# Patient Record
Sex: FEMALE | Race: WHITE | Employment: FULL TIME | URBAN - METROPOLITAN AREA
[De-identification: names, ages, dates, MRNs, and addresses within clinical notes are randomized per-mention and may not be internally consistent; named-entity substitution may affect disease eponyms.]

---

## 2017-08-21 ENCOUNTER — OFFICE VISIT (OUTPATIENT)
Dept: NEUROLOGY | Age: 62
End: 2017-08-21

## 2017-08-21 ENCOUNTER — HOSPITAL ENCOUNTER (OUTPATIENT)
Dept: GENERAL RADIOLOGY | Age: 62
Discharge: HOME | End: 2017-08-21
Payer: COMMERCIAL

## 2017-08-21 VITALS
DIASTOLIC BLOOD PRESSURE: 66 MMHG | RESPIRATION RATE: 16 BRPM | HEART RATE: 65 BPM | OXYGEN SATURATION: 98 % | HEIGHT: 66 IN | WEIGHT: 138 LBS | BODY MASS INDEX: 22.18 KG/M2 | SYSTOLIC BLOOD PRESSURE: 124 MMHG

## 2017-08-21 DIAGNOSIS — E11.42 DIABETIC PERIPHERAL NEUROPATHY ASSOCIATED WITH TYPE 2 DIABETES MELLITUS (HCC): ICD-10-CM

## 2017-08-21 DIAGNOSIS — G60.8 IDIOPATHIC SMALL AND LARGE FIBER SENSORY NEUROPATHY: Primary | ICD-10-CM

## 2017-08-21 DIAGNOSIS — M54.16 LUMBAR BACK PAIN WITH RADICULOPATHY AFFECTING RIGHT LOWER EXTREMITY: ICD-10-CM

## 2017-08-21 DIAGNOSIS — G60.8 IDIOPATHIC SMALL AND LARGE FIBER SENSORY NEUROPATHY: ICD-10-CM

## 2017-08-21 DIAGNOSIS — G44.209 TENSION VASCULAR HEADACHE: ICD-10-CM

## 2017-08-21 DIAGNOSIS — M47.22 CERVICAL RADICULOPATHY DUE TO DEGENERATIVE JOINT DISEASE OF SPINE: ICD-10-CM

## 2017-08-21 DIAGNOSIS — E53.8 B12 DEFICIENCY: ICD-10-CM

## 2017-08-21 DIAGNOSIS — E55.9 VITAMIN D DEFICIENCY: ICD-10-CM

## 2017-08-21 DIAGNOSIS — F51.04 CHRONIC INSOMNIA: ICD-10-CM

## 2017-08-21 DIAGNOSIS — I67.89 CEREBRAL MICROVASCULAR DISEASE: ICD-10-CM

## 2017-08-21 DIAGNOSIS — M54.16 LUMBAR BACK PAIN WITH RADICULOPATHY AFFECTING LEFT LOWER EXTREMITY: ICD-10-CM

## 2017-08-21 DIAGNOSIS — I65.23 STENOSIS OF BOTH INTERNAL CAROTID ARTERIES: ICD-10-CM

## 2017-08-21 PROCEDURE — 72052 X-RAY EXAM NECK SPINE 6/>VWS: CPT

## 2017-08-21 RX ORDER — AMITRIPTYLINE HYDROCHLORIDE 10 MG/1
10 TABLET, FILM COATED ORAL
Qty: 100 TAB | Refills: 11 | Status: SHIPPED | OUTPATIENT
Start: 2017-08-21 | End: 2018-03-07 | Stop reason: SDUPTHER

## 2017-08-21 NOTE — PROGRESS NOTES
Consult  REFERRED BY:  Bhupendra Quinn MD    CHIEF COMPLAINT: Headaches and migratory paresthesias and neck pain and insomnia and back pain      Subjective:     Rebel Easley is a 65 y.o. right-handed  female seen today as a new patient to me for evaluation of a new problem of progressive worsening headaches that come and go almost daily, at the request of Dr. Alecia Pond. Teeth have been getting worse over the last year, and no certain activity, time a day seems to bring him on, they can occur in any part of her head, sometimes generalized sometimes more on the right side, and are not associated with nausea vomiting. She does not have any new weakness or sensory loss with the headaches, but is convinced that something wrong. She has had no recent fever, trauma, infection or meningismus or other precipitating causes for the headaches. She does look a little depressed and admits to chronic insomnia, but says she has no worries. She had a previous EMG study that suggested some minor abnormalities as far as her ulnar nerves and peroneal nerves, but no clear diagnosis of neuropathy was made 3 years ago. The numbness and tingling seems equal in both arms and legs, worse in the legs in general, but can occur in the arms may be the right side greater than the left as far as the arms. She does have the neck pain. She has had no imaging of the head for the headaches. She has no x-rays of her neck. She does have known minor arthritis of the lumbar spine and a left hip problem that was previously evaluated by orthopedics. Her bowel and bladder function remained stable. She is not diabetic, and does not have a family history of neuropathy or other headache problems in the family. The only medication she really taking is a multivitamin, fish oil and Fosamax. She has no family history of diabetes. No other toxin exposure or other causes of her neuropathy. History reviewed.  No pertinent past medical history. History reviewed. No pertinent surgical history. Family History   Problem Relation Age of Onset    Dementia Father     Stroke Father     Cancer Maternal Aunt     Cancer Paternal Grandmother     Heart Disease Mother     Macular Degen Mother     Kidney Disease Mother      stones      Social History   Substance Use Topics    Smoking status: Never Smoker    Smokeless tobacco: Never Used    Alcohol use 0.0 oz/week     0 Standard drinks or equivalent per week      Comment: 1 glass wine per day         Current Outpatient Prescriptions:     DOCOSAHEXANOIC ACID/EPA (FISH OIL PO), Take  by mouth., Disp: , Rfl:     MULTIVIT-MINERALS/FOLIC ACID (ADULT MULTIVITAMIN GUMMIES PO), Take  by mouth., Disp: , Rfl:     amitriptyline (ELAVIL) 10 mg tablet, Take 1 Tab by mouth nightly., Disp: 100 Tab, Rfl: 11    alendronate (FOSAMAX) 70 mg tablet, , Disp: , Rfl: 4        No Known Allergies     Review of Systems:  A comprehensive review of systems was negative except for: Constitutional: positive for fatigue and malaise  Musculoskeletal: positive for myalgias, arthralgias, stiff joints, neck pain and back pain  Neurological: positive for headaches, paresthesia and Chronic insomnia  Behvioral/Psych: positive for Chronic insomnia   Vitals:    08/21/17 1104   BP: 124/66   Pulse: 65   Resp: 16   SpO2: 98%   Weight: 138 lb (62.6 kg)   Height: 5' 6\" (1.676 m)     Objective:     I      NEUROLOGICAL EXAM:    Appearance: The patient is well developed, well nourished, provides a coherent history and is in no acute distress. Mental Status: Oriented to time, place and person, and the president, cognitive function is normal and speech is fluent and no aphasia or dysarthria. Mood and affect appropriate, but looks a little depressed. Cranial Nerves:   Intact visual fields. Fundi are benign. SHALOM, EOM's full, no nystagmus, no ptosis. Facial sensation is normal. Corneal reflexes are not tested. Facial movement is symmetric. Hearing is normal bilaterally. Palate is midline with normal sternocleidomastoid and trapezius muscles are normal. Tongue is midline. Neck without meningismus or bruits  Temporal arteries are not tender or enlarged  Neck muscles are little tight and patient developed some pain on range of motion of the cervical spine, particularly on rotation to the right   Motor:  5/5 strength in upper and lower proximal and distal muscles. Normal bulk and tone. No fasciculations. Reflexes:   Deep tendon reflexes 1+/4 and symmetrical, ankle jerks are a little depressed, but no Tinel's over the median nerves. .  No babinski or clonus present   Sensory:   Abnormal to touch, pinprick and vibration and temperature in both feet to above the ankles. DSS is intact   Gait:  Abnormal gait as patient walks because of a limp on her left leg. Tremor:   No tremor noted. Cerebellar:  No cerebellar signs present. Neurovascular:  Normal heart sounds and regular rhythm, peripheral pulses decreased, and no carotid bruits. Assessment:       ICD-10-CM ICD-9-CM    1. Idiopathic small and large fiber sensory neuropathy G60.8 356.4 DOCOSAHEXANOIC ACID/EPA (FISH OIL PO)      MULTIVIT-MINERALS/FOLIC ACID (ADULT MULTIVITAMIN GUMMIES PO)      CASPER COMPREHENSIVE PLUS PANEL      CBC WITH AUTOMATED DIFF      VITAMIN D, 25 HYROXY PANEL      VITAMIN B12 & FOLATE      SED RATE (ESR)      IMMUNOELECTROPHORESIS (IMMUNOFIX.)      HEMOGLOBIN A1C WITH EAG      GM1 ANTIBODY IGG, IGM      MYELIN ASSOC GLYCOPROTEIN AB, IGM      GLIADIN ABS, IGA AND IGG      XR SPINE CERV W OBL/FLEX/EXT MIN 6 V COMP      EMG LIMITED      EMG NCV MOTOR WO F/WAVE PER NERVE      EMG NCV SENSORY PER NERVE      MRI BRAIN W WO CONT      DUPLEX CAROTID BILATERAL AMB NEURO      amitriptyline (ELAVIL) 10 mg tablet   2.  Tension vascular headache G44.209 307.81 DOCOSAHEXANOIC ACID/EPA (FISH OIL PO)      MULTIVIT-MINERALS/FOLIC ACID (ADULT MULTIVITAMIN GUMMIES PO)      CASPER COMPREHENSIVE PLUS PANEL      CBC WITH AUTOMATED DIFF      VITAMIN D, 25 HYROXY PANEL      VITAMIN B12 & FOLATE      SED RATE (ESR)      IMMUNOELECTROPHORESIS (IMMUNOFIX.)      HEMOGLOBIN A1C WITH EAG      GM1 ANTIBODY IGG, IGM      MYELIN ASSOC GLYCOPROTEIN AB, IGM      GLIADIN ABS, IGA AND IGG      XR SPINE CERV W OBL/FLEX/EXT MIN 6 V COMP      EMG LIMITED      EMG NCV MOTOR WO F/WAVE PER NERVE      EMG NCV SENSORY PER NERVE      MRI BRAIN W WO CONT      DUPLEX CAROTID BILATERAL AMB NEURO      amitriptyline (ELAVIL) 10 mg tablet   3. Lumbar back pain with radiculopathy affecting right lower extremity M54.17 724.4 DOCOSAHEXANOIC ACID/EPA (FISH OIL PO)      MULTIVIT-MINERALS/FOLIC ACID (ADULT MULTIVITAMIN GUMMIES PO)      CASPER COMPREHENSIVE PLUS PANEL      CBC WITH AUTOMATED DIFF      VITAMIN D, 25 HYROXY PANEL      VITAMIN B12 & FOLATE      SED RATE (ESR)      IMMUNOELECTROPHORESIS (IMMUNOFIX.)      HEMOGLOBIN A1C WITH EAG      GM1 ANTIBODY IGG, IGM      MYELIN ASSOC GLYCOPROTEIN AB, IGM      GLIADIN ABS, IGA AND IGG      XR SPINE CERV W OBL/FLEX/EXT MIN 6 V COMP      EMG LIMITED      EMG NCV MOTOR WO F/WAVE PER NERVE      EMG NCV SENSORY PER NERVE      MRI BRAIN W WO CONT      DUPLEX CAROTID BILATERAL AMB NEURO      amitriptyline (ELAVIL) 10 mg tablet   4.  Lumbar back pain with radiculopathy affecting left lower extremity M54.17 724.4 DOCOSAHEXANOIC ACID/EPA (FISH OIL PO)      MULTIVIT-MINERALS/FOLIC ACID (ADULT MULTIVITAMIN GUMMIES PO)      CASPER COMPREHENSIVE PLUS PANEL      CBC WITH AUTOMATED DIFF      VITAMIN D, 25 HYROXY PANEL      VITAMIN B12 & FOLATE      SED RATE (ESR)      IMMUNOELECTROPHORESIS (IMMUNOFIX.)      HEMOGLOBIN A1C WITH EAG      GM1 ANTIBODY IGG, IGM      MYELIN ASSOC GLYCOPROTEIN AB, IGM      GLIADIN ABS, IGA AND IGG      XR SPINE CERV W OBL/FLEX/EXT MIN 6 V COMP      EMG LIMITED      EMG NCV MOTOR WO F/WAVE PER NERVE      EMG NCV SENSORY PER NERVE      MRI BRAIN W WO CONT      DUPLEX CAROTID BILATERAL AMB NEURO      amitriptyline (ELAVIL) 10 mg tablet   5. Cervical radiculopathy due to degenerative joint disease of spine M47.22 721.0 DOCOSAHEXANOIC ACID/EPA (FISH OIL PO)      MULTIVIT-MINERALS/FOLIC ACID (ADULT MULTIVITAMIN GUMMIES PO)      CASPER COMPREHENSIVE PLUS PANEL      CBC WITH AUTOMATED DIFF      VITAMIN D, 25 HYROXY PANEL      VITAMIN B12 & FOLATE      SED RATE (ESR)      IMMUNOELECTROPHORESIS (IMMUNOFIX.)      HEMOGLOBIN A1C WITH EAG      GM1 ANTIBODY IGG, IGM      MYELIN ASSOC GLYCOPROTEIN AB, IGM      GLIADIN ABS, IGA AND IGG      XR SPINE CERV W OBL/FLEX/EXT MIN 6 V COMP      EMG LIMITED      EMG NCV MOTOR WO F/WAVE PER NERVE      EMG NCV SENSORY PER NERVE      MRI BRAIN W WO CONT      DUPLEX CAROTID BILATERAL AMB NEURO      amitriptyline (ELAVIL) 10 mg tablet   6. B12 deficiency E53.8 266.2 DOCOSAHEXANOIC ACID/EPA (FISH OIL PO)      MULTIVIT-MINERALS/FOLIC ACID (ADULT MULTIVITAMIN GUMMIES PO)      CASPER COMPREHENSIVE PLUS PANEL      CBC WITH AUTOMATED DIFF      VITAMIN D, 25 HYROXY PANEL      VITAMIN B12 & FOLATE      SED RATE (ESR)      IMMUNOELECTROPHORESIS (IMMUNOFIX.)      HEMOGLOBIN A1C WITH EAG      GM1 ANTIBODY IGG, IGM      MYELIN ASSOC GLYCOPROTEIN AB, IGM      GLIADIN ABS, IGA AND IGG      XR SPINE CERV W OBL/FLEX/EXT MIN 6 V COMP      EMG LIMITED      EMG NCV MOTOR WO F/WAVE PER NERVE      EMG NCV SENSORY PER NERVE      MRI BRAIN W WO CONT      DUPLEX CAROTID BILATERAL AMB NEURO      amitriptyline (ELAVIL) 10 mg tablet   7.  Diabetic peripheral neuropathy associated with type 2 diabetes mellitus (HCC) E11.42 250.60 DOCOSAHEXANOIC ACID/EPA (FISH OIL PO)     357.2 MULTIVIT-MINERALS/FOLIC ACID (ADULT MULTIVITAMIN GUMMIES PO)      CASPER COMPREHENSIVE PLUS PANEL      CBC WITH AUTOMATED DIFF      VITAMIN D, 25 HYROXY PANEL      VITAMIN B12 & FOLATE      SED RATE (ESR)      IMMUNOELECTROPHORESIS (IMMUNOFIX.)      HEMOGLOBIN A1C WITH EAG      GM1 ANTIBODY IGG, IGM      MYELIN ASSOC GLYCOPROTEIN AB, IGM      GLIADIN ABS, IGA AND IGG      XR SPINE CERV W OBL/FLEX/EXT MIN 6 V COMP      EMG LIMITED      EMG NCV MOTOR WO F/WAVE PER NERVE      EMG NCV SENSORY PER NERVE      MRI BRAIN W WO CONT      DUPLEX CAROTID BILATERAL AMB NEURO      amitriptyline (ELAVIL) 10 mg tablet   8. Chronic insomnia F51.04 780.52 DOCOSAHEXANOIC ACID/EPA (FISH OIL PO)      MULTIVIT-MINERALS/FOLIC ACID (ADULT MULTIVITAMIN GUMMIES PO)      CASPER COMPREHENSIVE PLUS PANEL      CBC WITH AUTOMATED DIFF      VITAMIN D, 25 HYROXY PANEL      VITAMIN B12 & FOLATE      SED RATE (ESR)      IMMUNOELECTROPHORESIS (IMMUNOFIX.)      HEMOGLOBIN A1C WITH EAG      GM1 ANTIBODY IGG, IGM      MYELIN ASSOC GLYCOPROTEIN AB, IGM      GLIADIN ABS, IGA AND IGG      XR SPINE CERV W OBL/FLEX/EXT MIN 6 V COMP      EMG LIMITED      EMG NCV MOTOR WO F/WAVE PER NERVE      EMG NCV SENSORY PER NERVE      MRI BRAIN W WO CONT      DUPLEX CAROTID BILATERAL AMB NEURO      amitriptyline (ELAVIL) 10 mg tablet   9. Vitamin D deficiency E55.9 268.9 DOCOSAHEXANOIC ACID/EPA (FISH OIL PO)      MULTIVIT-MINERALS/FOLIC ACID (ADULT MULTIVITAMIN GUMMIES PO)      CASPER COMPREHENSIVE PLUS PANEL      CBC WITH AUTOMATED DIFF      VITAMIN D, 25 HYROXY PANEL      VITAMIN B12 & FOLATE      SED RATE (ESR)      IMMUNOELECTROPHORESIS (IMMUNOFIX.)      HEMOGLOBIN A1C WITH EAG      GM1 ANTIBODY IGG, IGM      MYELIN ASSOC GLYCOPROTEIN AB, IGM      GLIADIN ABS, IGA AND IGG      XR SPINE CERV W OBL/FLEX/EXT MIN 6 V COMP      EMG LIMITED      EMG NCV MOTOR WO F/WAVE PER NERVE      EMG NCV SENSORY PER NERVE      MRI BRAIN W WO CONT      DUPLEX CAROTID BILATERAL AMB NEURO      amitriptyline (ELAVIL) 10 mg tablet   10.  Stenosis of both internal carotid arteries I65.23 433.10 DOCOSAHEXANOIC ACID/EPA (FISH OIL PO)     433.30 MULTIVIT-MINERALS/FOLIC ACID (ADULT MULTIVITAMIN GUMMIES PO)      CASPER COMPREHENSIVE PLUS PANEL      CBC WITH AUTOMATED DIFF      VITAMIN D, 25 HYROXY PANEL      VITAMIN B12 & FOLATE      SED RATE (ESR)      IMMUNOELECTROPHORESIS (IMMUNOFIX.)      HEMOGLOBIN A1C WITH EAG      GM1 ANTIBODY IGG, IGM      MYELIN ASSOC GLYCOPROTEIN AB, IGM      GLIADIN ABS, IGA AND IGG      XR SPINE CERV W OBL/FLEX/EXT MIN 6 V COMP      EMG LIMITED      EMG NCV MOTOR WO F/WAVE PER NERVE      EMG NCV SENSORY PER NERVE      MRI BRAIN W WO CONT      DUPLEX CAROTID BILATERAL AMB NEURO      amitriptyline (ELAVIL) 10 mg tablet   11. Cerebral microvascular disease I67.9 437.9 DOCOSAHEXANOIC ACID/EPA (FISH OIL PO)      MULTIVIT-MINERALS/FOLIC ACID (ADULT MULTIVITAMIN GUMMIES PO)      CASPER COMPREHENSIVE PLUS PANEL      CBC WITH AUTOMATED DIFF      VITAMIN D, 25 HYROXY PANEL      VITAMIN B12 & FOLATE      SED RATE (ESR)      IMMUNOELECTROPHORESIS (IMMUNOFIX.)      HEMOGLOBIN A1C WITH EAG      GM1 ANTIBODY IGG, IGM      MYELIN ASSOC GLYCOPROTEIN AB, IGM      GLIADIN ABS, IGA AND IGG      XR SPINE CERV W OBL/FLEX/EXT MIN 6 V COMP      EMG LIMITED      EMG NCV MOTOR WO F/WAVE PER NERVE      EMG NCV SENSORY PER NERVE      MRI BRAIN W WO CONT      DUPLEX CAROTID BILATERAL AMB NEURO      amitriptyline (ELAVIL) 10 mg tablet     Active Problems:    * No active hospital problems. *      Plan:     Patient with atypical headaches, progressively worsening, occurring almost every day now, and becoming more worrisome and bothersome for the patient. We will get an MRI scan of the brain to make sure there is no structural abnormality and check a sed rate. Patient will be tried on amitriptyline 10 mg at nighttime to help with her headaches and her insomnia, and can advance the dose up to 20 or 30 if needed. Patient will get repeat EMG studies to evaluate for possible progressive neuropathy in view of her sensory loss in her feet. She is a continue her vitamins in the interim. She will check my chart for her lab test,, and also for her MRI, we will do the EMG and couple weeks.   She will call if any problem in the interim, otherwise we will see her again in 6 months time or earlier as needed. Signed By: Андрей Mirza MD     August 21, 2017       CC: Olga Oneil MD  FAX: [phone]    This note will not be viewable in [address]

## 2017-08-21 NOTE — LETTER
NOTIFICATION RETURN TO WORK / SCHOOL 
 
8/21/2017 1:15 PM 
 
Ms. Mis Osborne [address] [phone] [id number] To Whom It May Concern: 
 
Mis Osborne is currently under the care of [address] [place] [address]. She will return to work/school on: 8/22/17 If there are questions or concerns please have the patient contact our office.  
 
 
 
Sincerely, 
 
 
Serina Ash LPN

## 2017-08-21 NOTE — PATIENT INSTRUCTIONS

## 2017-08-21 NOTE — MR AVS SNAPSHOT
Visit Information Date & Time Provider Department Dept. Phone Encounter #  
 8/21/2017 11:00 AM Hardy Purvis MD Neurology [facility] at [facility] [phone] [id number] Follow-up Instructions Return in about 6 months (around 2/21/2018). Your Appointments 9/20/2017  2:00 PM  
PROCEDURE with Hardy Purvis MD  
Neurology Clinic at [facility] Appt Note: procedure EMG both legs /right arm $ 0 CP jll 8/21/17  
 [address] [place] [address], [address] [phone]  
[address] [facility], [address] [place] [address], [address] [phone] Upcoming Health Maintenance Date Due Hepatitis C Screening 1955 DTaP/Tdap/Td series (1 - Tdap) 7/31/1977 PAP AKA CERVICAL CYTOLOGY 7/31/1977 FOBT Q 1 YEAR AGE 50-75 7/31/2006 BREAST CANCER SCRN MAMMOGRAM 10/6/2011 ZOSTER VACCINE AGE 60> 5/31/2016 INFLUENZA AGE 9 TO ADULT 8/1/2017 Allergies as of 8/21/2017  Review Complete On: 8/21/2017 By: Hardy Purvis MD  
 No Known Allergies Current Immunizations  Never Reviewed No immunizations on file. Not reviewed this visit You Were Diagnosed With   
  
 Codes Comments Idiopathic small and large fiber sensory neuropathy    -  Primary ICD-10-CM: G60.8 ICD-9-CM: 356.4 Tension vascular headache     ICD-10-CM: G44.209 ICD-9-CM: 307.81 Lumbar back pain with radiculopathy affecting right lower extremity     ICD-10-CM: M54.17 ICD-9-CM: 724.4 Lumbar back pain with radiculopathy affecting left lower extremity     ICD-10-CM: M54.17 ICD-9-CM: 724.4 Cervical radiculopathy due to degenerative joint disease of spine     ICD-10-CM: M47.22 
ICD-9-CM: 721.0 B12 deficiency     ICD-10-CM: E53.8 ICD-9-CM: 266.2 Diabetic peripheral neuropathy associated with type 2 diabetes mellitus (HCC)     ICD-10-CM: E11.42 
ICD-9-CM: 250.60, 357.2 Chronic insomnia     ICD-10-CM: F51.04 
ICD-9-CM: 780.52 Vitamin D deficiency     ICD-10-CM: E55.9 ICD-9-CM: 268.9 Stenosis of both internal carotid arteries     ICD-10-CM: I65.23 ICD-9-CM: 433.10, 433.30 Cerebral microvascular disease     ICD-10-CM: I67.9 ICD-9-CM: 437.9 Vitals BP Pulse Resp Height(growth percentile) Weight(growth percentile) SpO2  
 124/66 65 16 5' 6\" (1.676 m) 138 lb (62.6 kg) 98% BMI Smoking Status 22.27 kg/m2 Never Smoker Vitals History BMI and BSA Data Body Mass Index Body Surface Area  
 22.27 kg/m 2 1.71 m 2 Preferred Pharmacy Pharmacy Name Phone [facility] [address] [place] [address], [address] [facility] [address] AT [address] [place] [address] [phone] Your Updated Medication List  
  
   
This list is accurate as of: 8/21/17 11:48 AM.  Always use your most recent med list.  
  
  
  
  
 ADULT MULTIVITAMIN GUMMIES PO Take  by mouth. alendronate 70 mg tablet Commonly known as:  FOSAMAX  
  
 amitriptyline 10 mg tablet Commonly known as:  ELAVIL Take 1 Tab by mouth nightly. FISH OIL PO Take  by mouth. Prescriptions Sent to Pharmacy Refills  
 amitriptyline (ELAVIL) 10 mg tablet 11 Sig: Take 1 Tab by mouth nightly. Class: Normal  
 Pharmacy: [facility] [address] [place] [address], [address] [place] [address] #: [phone] Route: Oral  
  
We Performed the Following CASPER COMPREHENSIVE PLUS PANEL [[id number] Custom] CBC WITH AUTOMATED DIFF [31622 CPT(R)] GLIADIN ABS, IGA AND IGG [[id number] Custom] GM1 ANTIBODY IGG, IGM [24720 CPT(R)] HEMOGLOBIN A1C WITH EAG [94469 CPT(R)] IMMUNOELECTROPHORESIS [facility] [id number] CPT(R)] MYELIN ASSOC GLYCOPROTEIN AB, IGM [id number] CPT(R)] SED RATE (ESR) [id number] CPT(R)] VITAMIN B12 & FOLATE [72195 CPT(R)] VITAMIN D, 25 HYROXY PANEL [[id number] Custom] Follow-up Instructions Return in about 6 months (around 2/21/2018). To-Do List   
 08/21/2017 Imaging:  XR SPINE CERV W OBL/FLEX/EXT MIN 6 V COMP   
  
 08/24/2017 Imaging:  DUPLEX CAROTID BILATERAL AMB NEURO   
  
 08/28/2017 Imaging:  MRI BRAIN W WO CONT   
  
 09/20/2017 Neurology:  EMG LIMITED   
  
 09/20/2017 Neurology:  EMG NCV MOTOR WO F/WAVE PER NERVE   
  
 09/20/2017 Neurology:  EMG NCV SENSORY PER NERVE Patient Instructions A Healthy Lifestyle: Care Instructions Your Care Instructions A healthy lifestyle can help you feel good, stay at a healthy weight, and have plenty of energy for both work and play. A healthy lifestyle is something you can share with your whole family. A healthy lifestyle also can lower your risk for serious health problems, such as high blood pressure, heart disease, and diabetes. You can follow a few steps listed below to improve your health and the health of your family. Follow-up care is a key part of your treatment and safety. Be sure to make and go to all appointments, and call your doctor if you are having problems. Its also a good idea to know your test results and keep a list of the medicines you take. How can you care for yourself at home? · Do not eat too much sugar, fat, or fast foods. You can still have dessert and treats now and then. The goal is moderation. · Start small to improve your eating habits. Pay attention to portion sizes, drink less juice and soda pop, and eat more fruits and vegetables. ¨ Eat a healthy amount of food. A 3-ounce serving of meat, for example, is about the size of a deck of cards. Fill the rest of your plate with vegetables and whole grains. ¨ Limit the amount of soda and sports drinks you have every day. Drink more water when you are thirsty. ¨ Eat at least 5 servings of fruits and vegetables every day.  It may seem like a lot, but it is not hard to reach this goal. A serving or helping is 1 piece of fruit, 1 cup of vegetables, or 2 cups of leafy, raw vegetables. Have an apple or some carrot sticks as an afternoon snack instead of a candy bar. Try to have fruits and/or vegetables at every meal. 
· Make exercise part of your daily routine. You may want to start with simple activities, such as walking, bicycling, or slow swimming. Try to be active 30 to 60 minutes every day. You do not need to do all 30 to 60 minutes all at once. For example, you can exercise 3 times a day for 10 or 20 minutes. Moderate exercise is safe for most people, but it is always a good idea to talk to your doctor before starting an exercise program. 
· Keep moving. Mulga Dine the lawn, work in the garden, or Goshi. Take the stairs instead of the elevator at work. · If you smoke, quit. People who smoke have an increased risk for heart attack, stroke, cancer, and other lung illnesses. Quitting is hard, but there are ways to boost your chance of quitting tobacco for good. ¨ Use nicotine gum, patches, or lozenges. ¨ Ask your doctor about stop-smoking programs and medicines. ¨ Keep trying. In addition to reducing your risk of diseases in the future, you will notice some benefits soon after you stop using tobacco. If you have shortness of breath or asthma symptoms, they will likely get better within a few weeks after you quit. · Limit how much alcohol you drink. Moderate amounts of alcohol (up to 2 drinks a day for men, 1 drink a day for women) are okay. But drinking too much can lead to liver problems, high blood pressure, and other health problems. Family health If you have a family, there are many things you can do together to improve your health. · Eat meals together as a family as often as possible. · Eat healthy foods. This includes fruits, vegetables, lean meats and dairy, and whole grains. · Include your family in your fitness plan. Most people think of activities such as jogging or tennis as the way to fitness, but there are many ways you and your family can be more active. Anything that makes you breathe hard and gets your heart pumping is exercise. Here are some tips: 
¨ Walk to do errands or to take your child to school or the bus. ¨ Go for a family bike ride after dinner instead of watching TV. Where can you learn more? Go to http://maribel-sreedhar.info/. Enter V031 in the search box to learn more about \"A Healthy Lifestyle: Care Instructions. \" Current as of: July 26, 2016 Content Version: 11.3 © 8407-3601 [employer]. Care instructions adapted under license by [employer] (which disclaims liability or warranty for this information). If you have questions about a medical condition or this instruction, always ask your healthcare professional. [address] any warranty or liability for your use of this information. Introducing [facility]! Dear Jose Orn: Thank you for requesting a [employer] account. Our records indicate that you already have an active [employer] account. You can access your account anytime at [email or web address] Did you know that you can access your hospital and ER discharge instructions at any time in [employer]? You can also review all of your test results from your hospital stay or ER visit. Additional Information If you have questions, please visit the Frequently Asked Questions section of the [employer] website at [email or web address] Remember, [employer] is NOT to be used for urgent needs. For medical emergencies, dial 911. Now available from your iPhone and Android! Please provide this summary of care documentation to your next provider. Your primary care clinician is listed as Graeme Clinton.  If you have any questions after today's visit, please call [phone].

## 2017-08-29 LAB
25(OH)D2 SERPL-MCNC: <1 NG/ML
25(OH)D3 SERPL-MCNC: 31 NG/ML
25(OH)D3+25(OH)D2 SERPL-MCNC: 31 NG/ML
BASOPHILS # BLD AUTO: 0 X10E3/UL (ref 0–0.2)
BASOPHILS NFR BLD AUTO: 0 %
CENTROMERE B AB SER-ACNC: <0.2 AI (ref 0–0.9)
CHROMATIN AB SERPL-ACNC: <0.2 AI (ref 0–0.9)
DSDNA AB SER-ACNC: 1 IU/ML (ref 0–9)
ENA JO1 AB SER-ACNC: <0.2 AI (ref 0–0.9)
ENA RNP AB SER-ACNC: 0.2 AI (ref 0–0.9)
ENA SCL70 AB SER-ACNC: <0.2 AI (ref 0–0.9)
ENA SM AB SER-ACNC: <0.2 AI (ref 0–0.9)
ENA SM+RNP AB SER-ACNC: <0.2 AI (ref 0–0.9)
ENA SS-A AB SER-ACNC: <0.2 AI (ref 0–0.9)
ENA SS-B AB SER-ACNC: <0.2 AI (ref 0–0.9)
EOSINOPHIL # BLD AUTO: 0.1 X10E3/UL (ref 0–0.4)
EOSINOPHIL NFR BLD AUTO: 1 %
ERYTHROCYTE [DISTWIDTH] IN BLOOD BY AUTOMATED COUNT: 14.7 % (ref 12.3–15.4)
ERYTHROCYTE [SEDIMENTATION RATE] IN BLOOD BY WESTERGREN METHOD: 6 MM/HR (ref 0–40)
EST. AVERAGE GLUCOSE BLD GHB EST-MCNC: 120 MG/DL
FOLATE SERPL-MCNC: >20 NG/ML
GLIADIN PEPTIDE IGA SER-ACNC: 2 UNITS (ref 0–19)
GLIADIN PEPTIDE IGG SER-ACNC: 2 UNITS (ref 0–19)
GM1 GANGL IGG TITR SER IA: NORMAL TITER
GM1 GANGL IGM TITR SER IA: NORMAL TITER
HBA1C MFR BLD: 5.8 % (ref 4.8–5.6)
HCT VFR BLD AUTO: 42.4 % (ref 34–46.6)
HGB BLD-MCNC: 13.9 G/DL (ref 11.1–15.9)
IGA SERPL-MCNC: 173 MG/DL (ref 87–352)
IGG SERPL-MCNC: 923 MG/DL (ref 700–1600)
IGM SERPL-MCNC: 53 MG/DL (ref 26–217)
IMM GRANULOCYTES # BLD: 0 X10E3/UL (ref 0–0.1)
IMM GRANULOCYTES NFR BLD: 0 %
LYMPHOCYTES # BLD AUTO: 2.2 X10E3/UL (ref 0.7–3.1)
LYMPHOCYTES NFR BLD AUTO: 25 %
MAG IGM TITR SER: NORMAL TITER
MCH RBC QN AUTO: 28.8 PG (ref 26.6–33)
MCHC RBC AUTO-ENTMCNC: 32.8 G/DL (ref 31.5–35.7)
MCV RBC AUTO: 88 FL (ref 79–97)
MONOCYTES # BLD AUTO: 0.4 X10E3/UL (ref 0.1–0.9)
MONOCYTES NFR BLD AUTO: 5 %
NEUTROPHILS # BLD AUTO: 5.9 X10E3/UL (ref 1.4–7)
NEUTROPHILS NFR BLD AUTO: 69 %
PLATELET # BLD AUTO: 394 X10E3/UL (ref 150–379)
PROT PATTERN SERPL IFE-IMP: NORMAL
RBC # BLD AUTO: 4.82 X10E6/UL (ref 3.77–5.28)
RIBOSOMAL P AB SER-ACNC: 0.2 AI (ref 0–0.9)
SEE BELOW:, 164879: NORMAL
VIT B12 SERPL-MCNC: 406 PG/ML (ref 211–946)
WBC # BLD AUTO: 8.7 X10E3/UL (ref 3.4–10.8)

## 2017-08-31 ENCOUNTER — OFFICE VISIT (OUTPATIENT)
Dept: NEUROLOGY | Age: 62
End: 2017-08-31

## 2017-08-31 ENCOUNTER — TELEPHONE (OUTPATIENT)
Dept: NEUROLOGY | Age: 62
End: 2017-08-31

## 2017-08-31 DIAGNOSIS — E55.9 VITAMIN D DEFICIENCY: ICD-10-CM

## 2017-08-31 DIAGNOSIS — M54.16 LUMBAR BACK PAIN WITH RADICULOPATHY AFFECTING RIGHT LOWER EXTREMITY: ICD-10-CM

## 2017-08-31 DIAGNOSIS — F51.04 CHRONIC INSOMNIA: ICD-10-CM

## 2017-08-31 DIAGNOSIS — M54.16 LUMBAR BACK PAIN WITH RADICULOPATHY AFFECTING LEFT LOWER EXTREMITY: ICD-10-CM

## 2017-08-31 DIAGNOSIS — E11.42 DIABETIC PERIPHERAL NEUROPATHY ASSOCIATED WITH TYPE 2 DIABETES MELLITUS (HCC): ICD-10-CM

## 2017-08-31 DIAGNOSIS — M47.22 CERVICAL RADICULOPATHY DUE TO DEGENERATIVE JOINT DISEASE OF SPINE: ICD-10-CM

## 2017-08-31 DIAGNOSIS — G60.8 IDIOPATHIC SMALL AND LARGE FIBER SENSORY NEUROPATHY: ICD-10-CM

## 2017-08-31 DIAGNOSIS — G44.209 TENSION VASCULAR HEADACHE: ICD-10-CM

## 2017-08-31 DIAGNOSIS — I67.89 CEREBRAL MICROVASCULAR DISEASE: Primary | ICD-10-CM

## 2017-08-31 DIAGNOSIS — E53.8 B12 DEFICIENCY: ICD-10-CM

## 2017-08-31 DIAGNOSIS — I65.23 STENOSIS OF BOTH INTERNAL CAROTID ARTERIES: ICD-10-CM

## 2017-09-01 NOTE — PROCEDURES
This study consisted of pulsed wave Doppler examination, Color-flow imaging, and Duplex imaging of both the right and left carotid systems, and both vertebral arteries.        Imaging of both right and left carotid systems showed mild mixed plaquing at the bifurcations and proximal and distal internal and external carotid arteries bilaterally, with stenosis in the range of 0-15 % only and with no flow abnormalities identified.        Both vertebral arteries showed normal antegrade flow.         Clinical correlation recommended

## 2017-09-05 ENCOUNTER — HOSPITAL ENCOUNTER (OUTPATIENT)
Dept: MRI IMAGING | Age: 62
Discharge: HOME | End: 2017-09-05
Attending: PSYCHIATRY & NEUROLOGY

## 2017-09-05 DIAGNOSIS — M54.16 LUMBAR BACK PAIN WITH RADICULOPATHY AFFECTING RIGHT LOWER EXTREMITY: ICD-10-CM

## 2017-09-05 DIAGNOSIS — M54.16 LUMBAR BACK PAIN WITH RADICULOPATHY AFFECTING LEFT LOWER EXTREMITY: ICD-10-CM

## 2017-09-05 DIAGNOSIS — G60.8 IDIOPATHIC SMALL AND LARGE FIBER SENSORY NEUROPATHY: ICD-10-CM

## 2017-09-05 DIAGNOSIS — E55.9 VITAMIN D DEFICIENCY: ICD-10-CM

## 2017-09-05 DIAGNOSIS — G44.209 TENSION VASCULAR HEADACHE: ICD-10-CM

## 2017-09-05 DIAGNOSIS — M47.22 CERVICAL RADICULOPATHY DUE TO DEGENERATIVE JOINT DISEASE OF SPINE: ICD-10-CM

## 2017-09-05 DIAGNOSIS — E53.8 B12 DEFICIENCY: ICD-10-CM

## 2017-09-05 DIAGNOSIS — F51.04 CHRONIC INSOMNIA: ICD-10-CM

## 2017-09-05 DIAGNOSIS — E11.42 DIABETIC PERIPHERAL NEUROPATHY ASSOCIATED WITH TYPE 2 DIABETES MELLITUS (HCC): ICD-10-CM

## 2017-09-05 DIAGNOSIS — I65.23 STENOSIS OF BOTH INTERNAL CAROTID ARTERIES: ICD-10-CM

## 2017-09-05 DIAGNOSIS — I67.89 CEREBRAL MICROVASCULAR DISEASE: ICD-10-CM

## 2017-09-06 DIAGNOSIS — G44.209 TENSION VASCULAR HEADACHE: Primary | ICD-10-CM

## 2017-09-19 ENCOUNTER — HOSPITAL ENCOUNTER (OUTPATIENT)
Dept: CT IMAGING | Age: 62
Discharge: HOME | End: 2017-09-19
Attending: PSYCHIATRY & NEUROLOGY
Payer: COMMERCIAL

## 2017-09-19 DIAGNOSIS — G44.209 TENSION VASCULAR HEADACHE: ICD-10-CM

## 2017-09-19 PROCEDURE — 70450 CT HEAD/BRAIN W/O DYE: CPT

## 2017-09-20 ENCOUNTER — OFFICE VISIT (OUTPATIENT)
Dept: NEUROLOGY | Age: 62
End: 2017-09-20

## 2017-09-20 VITALS — DIASTOLIC BLOOD PRESSURE: 81 MMHG | OXYGEN SATURATION: 99 % | SYSTOLIC BLOOD PRESSURE: 119 MMHG | HEART RATE: 78 BPM

## 2017-09-20 DIAGNOSIS — E53.8 B12 DEFICIENCY: ICD-10-CM

## 2017-09-20 DIAGNOSIS — M54.16 LUMBAR BACK PAIN WITH RADICULOPATHY AFFECTING RIGHT LOWER EXTREMITY: ICD-10-CM

## 2017-09-20 DIAGNOSIS — G56.21 ULNAR NEUROPATHY AT ELBOW OF RIGHT UPPER EXTREMITY: ICD-10-CM

## 2017-09-20 DIAGNOSIS — G60.8 IDIOPATHIC SMALL AND LARGE FIBER SENSORY NEUROPATHY: Primary | ICD-10-CM

## 2017-09-20 DIAGNOSIS — E11.42 DIABETIC PERIPHERAL NEUROPATHY ASSOCIATED WITH TYPE 2 DIABETES MELLITUS (HCC): ICD-10-CM

## 2017-09-20 DIAGNOSIS — M47.22 CERVICAL RADICULOPATHY DUE TO DEGENERATIVE JOINT DISEASE OF SPINE: ICD-10-CM

## 2017-09-20 DIAGNOSIS — M54.16 LUMBAR BACK PAIN WITH RADICULOPATHY AFFECTING LEFT LOWER EXTREMITY: ICD-10-CM

## 2017-09-20 NOTE — PROCEDURES
[facility] at [address] [facility] [address] [facility], [address] [facility]  Tel [phone]     Fax [phone]  Electrodiagnostic Study Report  Test Date:  2017    Patient: Spencer Bell : 1955 Physician: Francisco Myers MD   Sex: Female  < Ref Phys: Douglas Race     Clinical Indication: Patient is a 71-year-old female with a history of numbness in both hands and feet, neck pain and back pain, EMG study to rule out neuropathy, rule out entrapment neuropathy, rule out polyneuropathy, without other neuromuscular disease. Patient examination shows slight decreased sensation in both feet, hypoactive reflexes, but no other focal weakness, atrophy, muscle weakness, no Babinski no clonus, and cranial nerve function is all normal 2 through 12 throughout. Impression: This study shows electrophysiologic evidence of    1). 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 There was no acute denervation changes seen. There is no clear evidence of motor radiculopathy in the arms or legs from the neck or back. This neuropathy would be compatible with various toxic, metabolic or acquired neuropathies. 2). 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   There is no evidence of cervical radiculopathy or lumbar radiculopathy on the basis of this exam.  Clinical correlation recommended, and follow-up studies may be of value following this patient if clinically indicated in 6-12 months time. EMG & NCV Findings:  Evaluation of the Right ulnar motor nerve showed decreased conduction velocity (A Elbow-B Elbow, 40 m/s). The Right Sup Fibular sensory nerve showed decreased conduction velocity (Lower leg-Lat ankle, 38 m/s). All remaining nerves (as indicated in the following tables) were within normal limits. All F Wave latencies were within normal limits.         Nerve Conduction Studies  Anti Sensory Summary Table     Site NR Peak (ms) P-T Amp (µV) Site1 Site2 Dist (cm)   Left Median Anti Sensory (2nd Digit)  31.8°C   Wrist    3.1 76.7 Wrist 2nd Digit 14.0   Right Median Anti Sensory (2nd Digit)  30.9°C   Wrist    3.4 51.5 Wrist 2nd Digit 14.0   Right Radial Anti Sensory (Base 1st Digit)  30.9°C   Wrist    2.3 29.0 Wrist Base 1st Digit 10.0   Left Sup Fibular Anti Sensory (Lat ankle)  30.1°C   Lower leg    2.6 15.6 Lower leg Lat ankle 10.0   Right Sup Fibular Anti Sensory (Lat ankle)  29.6°C   Lower leg    2.9 4.3 Lower leg Lat ankle 10.0   Left Sural Anti Sensory (Lat Mall)  30.4°C   Calf    3.6 20.4 Calf Lat Mall 14.0   Right Sural Anti Sensory (Lat Mall)  34.8°C   Calf    3.5 10.8 Calf Lat Mall 14.0   Left Ulnar Anti Sensory (5th Digit)  31.5°C   Wrist    3.7 59.8 Wrist 5th Digit 14.0   Right Ulnar Anti Sensory (5th Digit)  30.9°C   Wrist    3.8 31.6 Wrist 5th Digit 14.0     Motor Summary Table     Site NR Onset (ms) O-P Amp (mV) P-T Amp (mV) Site1 Site2 Dist (cm) Leobardo (m/s)   Left Fibular Motor (Ext Dig Brev)  31.9°C   Ankle    5.4 1.8 2.8 Ankle Ext Dig Brev 8.0    B Fib    12.6 1.8 2.3 B Fib Ankle 30.5 42   Poplt    14.8 1.5 2.7 Poplt B Fib 10.0 45   Right Fibular Motor (Ext Dig Brev)  30.6°C   Ankle    6.1 2.0 3.5 Ankle Ext Dig Brev 8.0    B Fib    13.2 1.5 2.2 B Fib Ankle 29.0 41   Poplt    15.3 1.5 2.4 Poplt B Fib 10.0 48   Right Median Motor (Abd Poll Brev)  30.8°C   Wrist    4.1 6.6 11.2 Wrist Abd Poll Brev 8.0    Elbow    8.0 6.3 10.7 Elbow Wrist 22.5 58   Left Tibial Motor (Abd Rosario Brev)  30.9°C   Ankle    4.9 12.5 21.1 Ankle Abd Rosario Brev 8.0    Knee    14.1 9.0 14.5 Knee Ankle 42.5 46   Right Tibial Motor (Abd Rosario Brev)  30.5°C   Ankle    4.3 15.5 23.1 Ankle Abd Rosario Brev 8.0    Knee    14.2 8.4 13.1 Knee Ankle 41.0 41   Left Ulnar Motor (Abd Dig Minimi)  31.2°C   Wrist    3.0 8.1 12.3 Wrist Abd Dig Minimi 8.0    B Elbow    6.1 7.6 11.9 B Elbow Wrist 17.0 55   A Elbow    8.0 7.1 11.2 A Elbow B Elbow 10.0 53   Right Ulnar Motor (Abd Dig Minimi)  31.2°C   Wrist    3.0 9.0 13.1 Wrist Abd Dig Minimi 8.0    B Elbow    6.3 8.4 12.6 B Elbow Wrist 17.5 53   A Elbow    8.8 7.6 11.5 A Elbow B Elbow 10.0 40     F Wave Studies     NR F-Lat (ms) L-R F-Lat (ms)   Right Median (Mrkrs) (Abd Poll Brev)  31.3°C      30.09    Right Peroneal (Mrkrs) (EDB)  31.4°C      54.25    Right Tibial (Mrkrs) (Abd Hallucis)  31°C      55.51    Right Ulnar (Mrkrs) (Abd Dig Min)  30.9°C      27.62      EMG     Side Muscle Nerve Root Ins Act Fibs Psw Recrt Duration Amp Poly Comment   Right Abd Poll Brev Median C8-T1 Nml Nml Nml Nml Nml Nml Nml    Right 1stDorInt Ulnar C8-T1 Nml Nml Nml Nml Nml Nml Nml    Right Biceps Musculocut C5-6 Nml Nml Nml Nml Nml Nml Nml    Right Triceps Radial C6-7-8 Nml Nml Nml Nml Nml Nml Nml    Right FlexPolLong Median (Ant Int) C7-8 Nml Nml Nml Nml Nml Nml Nml    Right ABD Dig Min Ulnar C8-T1 Nml Nml Nml Nml Nml Nml Nml    Right FlexCarpiUln Ulnar C8,T1 Nml Nml Nml Nml Nml Nml Nml    Right Lower Cerv Parasp Rami C7,T1 Nml Nml Nml Nml Nml Nml Nml    Right Ext Dig Brev Dp Br Peron L5, S1 Nml Nml Nml Reduced Incr Incr 2+    Right AbdHallucis MedPlantar S1-2 Nml Nml Nml Nml Incr Incr 1+    Right AntTibialis Dp Br Peron L4-5 Nml Nml Nml Nml Nml Nml Nml    Right MedGastroc Tibial S1-2 Nml Nml Nml Nml Nml Nml Nml    Right VastusLat Femoral L2-4 Nml Nml Nml Nml Nml Nml Nml    Right Lower Lumb Parasp Rami L5,S1 Nml Nml Nml Nml Nml Nml Nml    Left Ext Dig Brev Dp Br Peron L5, S1 Nml Nml Nml Reduced Incr Incr 2+    Left AbdHallucis MedPlantar S1-2 Nml Nml Nml Nml Incr Incr 1+    Left AntTibialis Dp Br Peron L4-5 Nml Nml Nml Nml Nml Nml Nml    Left MedGastroc Tibial S1-2 Nml Nml Nml Nml Nml Nml Nml    Left VastusLat Femoral L2-4 Nml Nml Nml Nml Nml Nml Nml    Left Lower Lumb Parasp Rami L5,S1 Nml Nml Nml Nml Nml Nml Nml    Left 1stDorInt Ulnar C8-T1 Nml Nml Nml Nml Nml Nml Nml    Left Abd Poll Brev Median C8-T1 Nml Nml Nml Nml Nml Nml Nml    Left Biceps Musculocut C5-6 Nml Nml Nml Nml Nml Nml Nml    Left Triceps Radial C6-7-8 Nml Nml Nml Nml Nml Nml Nml    Left FlexCarpiUln Ulnar C8,T1 Nml Nml Nml Nml Nml Nml Nml    Left FlexPolLong Median (Ant Int) C7-8 Nml Nml Nml Nml Nml Nml Nml    Left ABD Dig Min Ulnar C8-T1 Nml Nml Nml Nml Nml Nml Nml    Left Lower Cerv Parasp Rami C7,T1 Nml Nml Nml Nml Nml Nml Nml        Waveforms:                                                      __________________  Kelly Huertas M.D.

## 2017-09-22 PROBLEM — G56.21 ULNAR NEUROPATHY AT ELBOW OF RIGHT UPPER EXTREMITY: Status: ACTIVE | Noted: 2017-09-22

## 2017-10-14 ENCOUNTER — APPOINTMENT (OUTPATIENT)
Dept: GENERAL RADIOLOGY | Age: 62
End: 2017-10-14
Attending: PHYSICIAN ASSISTANT
Payer: COMMERCIAL

## 2017-10-14 ENCOUNTER — HOSPITAL ENCOUNTER (EMERGENCY)
Age: 62
Discharge: HOME | End: 2017-10-14
Attending: EMERGENCY MEDICINE | Admitting: EMERGENCY MEDICINE
Payer: COMMERCIAL

## 2017-10-14 VITALS
SYSTOLIC BLOOD PRESSURE: 154 MMHG | HEART RATE: 90 BPM | DIASTOLIC BLOOD PRESSURE: 105 MMHG | TEMPERATURE: 98.1 F | RESPIRATION RATE: 12 BRPM | OXYGEN SATURATION: 100 %
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
## 2017-10-14 DIAGNOSIS — M54.2 NECK PAIN: ICD-10-CM

## 2017-10-14 DIAGNOSIS — M50.30 DDD (DEGENERATIVE DISC DISEASE), CERVICAL: ICD-10-CM

## 2017-10-14 PROCEDURE — 99283 EMERGENCY DEPT VISIT LOW MDM: CPT

## 2017-10-14 PROCEDURE — 72050 X-RAY EXAM NECK SPINE 4/5VWS: CPT

## 2017-10-14 RX ORDER — CARISOPRODOL 350 MG/1
350 TABLET ORAL 4 TIMES DAILY
Qty: 12 TAB | Refills: 0 | Status: SHIPPED | OUTPATIENT
Start: 2017-10-14 | End: 2018-03-07

## 2017-10-14 RX ORDER — OXYCODONE AND ACETAMINOPHEN 5; 325 MG/1; MG/1
1 TABLET ORAL
Qty: 12 TAB | Refills: 0 | Status: SHIPPED | OUTPATIENT
Start: 2017-10-14 | End: 2018-03-07
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
## 2017-10-14 NOTE — ED NOTES
NOELLE Jolley reviewed discharge instructions with the patient. The patient verbalized understanding.
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
## 2017-10-14 NOTE — LETTER
[address] 
[facility] EMERGENCY DEPT 
[address] [place] [address]. [phone] Work/School Note Date: 10/14/2017 To Whom It May concern: 
 
Aneesh Pop was seen and treated today in the emergency room by the following provider(s): 
Attending Provider: Aldair Leggett DO Physician Assistant: NOELLE Butts. Aneesh Pop NO WORK 48 HOURS Sincerely, NOELLE Butts

## 2018-03-07 ENCOUNTER — OFFICE VISIT (OUTPATIENT)
Dept: NEUROLOGY | Age: 63
End: 2018-03-07

## 2018-03-07 VITALS
HEIGHT: 66 IN | DIASTOLIC BLOOD PRESSURE: 62 MMHG | SYSTOLIC BLOOD PRESSURE: 92 MMHG | HEART RATE: 83 BPM | WEIGHT: 141 LBS | BODY MASS INDEX: 22.66 KG/M2 | OXYGEN SATURATION: 97 %

## 2018-03-07 DIAGNOSIS — G60.8 IDIOPATHIC SMALL AND LARGE FIBER SENSORY NEUROPATHY: Primary | ICD-10-CM

## 2018-03-07 DIAGNOSIS — G44.209 TENSION VASCULAR HEADACHE: ICD-10-CM

## 2018-03-07 DIAGNOSIS — M25.551 PAIN OF RIGHT HIP JOINT: ICD-10-CM

## 2018-03-07 RX ORDER — AMITRIPTYLINE HYDROCHLORIDE 10 MG/1
20 TABLET, FILM COATED ORAL
Qty: 180 TAB | Refills: 3 | Status: SHIPPED | OUTPATIENT
Start: 2018-03-07 | End: 2019-01-07

## 2018-03-07 NOTE — PATIENT INSTRUCTIONS
Amitriptyline (Elavil) 10 mg tabs: Take 2 tabs at bedtime      [facility]   Statement to Patients  April 1, 2014      In an effort to ensure the large volume of patient prescription refills is processed in the most efficient and expeditious manner, we are asking our patients to assist us by calling your Pharmacy for all prescription refills, this will include also your  Mail Order Pharmacy. The pharmacy will contact our office electronically to continue the refill process. Please do not wait until the last minute to call your pharmacy. We need at least 48 hours (2days) to fill prescriptions. We also encourage you to call your pharmacy before going to  your prescription to make sure it is ready. With regard to controlled substance prescription refill requests (narcotic refills) that need to be picked up at our office, we ask your cooperation by providing us with at least 72 hours (3days) notice that you will need a refill. We will not refill narcotic prescription refill requests after 4:00pm on any weekday, Monday through Thursday, or after 2:00pm on Fridays, or on the weekends. We encourage everyone to explore another way of getting your prescription refill request processed using [employer], our patient web portal through our electronic medical record system. [employer] is an efficient and effective way to communicate your medication request directly to the office and  downloadable as an margareth on your smart phone . [employer] also features a review functionality that allows you to view your medication list as well as leave messages for your physician. Are you ready to get connected? If so please review the attatched instructions or speak to any of our staff to get you set up right away! Thank you so much for your cooperation. Should you have any questions please contact our Practice Administrator.     The Physicians and Staff,  [facility]          A Healthy Lifestyle: Care Instructions  Your Care Instructions    A healthy lifestyle can help you feel good, stay at a healthy weight, and have plenty of energy for both work and play. A healthy lifestyle is something you can share with your whole family. A healthy lifestyle also can lower your risk for serious health problems, such as high blood pressure, heart disease, and diabetes. You can follow a few steps listed below to improve your health and the health of your family. Follow-up care is a key part of your treatment and safety. Be sure to make and go to all appointments, and call your doctor if you are having problems. It's also a good idea to know your test results and keep a list of the medicines you take. How can you care for yourself at home? · Do not eat too much sugar, fat, or fast foods. You can still have dessert and treats now and then. The goal is moderation. · Start small to improve your eating habits. Pay attention to portion sizes, drink less juice and soda pop, and eat more fruits and vegetables. ¨ Eat a healthy amount of food. A 3-ounce serving of meat, for example, is about the size of a deck of cards. Fill the rest of your plate with vegetables and whole grains. ¨ Limit the amount of soda and sports drinks you have every day. Drink more water when you are thirsty. ¨ Eat at least 5 servings of fruits and vegetables every day. It may seem like a lot, but it is not hard to reach this goal. A serving or helping is 1 piece of fruit, 1 cup of vegetables, or 2 cups of leafy, raw vegetables. Have an apple or some carrot sticks as an afternoon snack instead of a candy bar. Try to have fruits and/or vegetables at every meal.  · Make exercise part of your daily routine. You may want to start with simple activities, such as walking, bicycling, or slow swimming. Try to be active 30 to 60 minutes every day. You do not need to do all 30 to 60 minutes all at once.  For example, you can exercise 3 times a day for 10 or 20 minutes. Moderate exercise is safe for most people, but it is always a good idea to talk to your doctor before starting an exercise program.  · Keep moving. Lisa Hayspin the lawn, work in the garden, or [employer]. Take the stairs instead of the elevator at work. · If you smoke, quit. People who smoke have an increased risk for heart attack, stroke, cancer, and other lung illnesses. Quitting is hard, but there are ways to boost your chance of quitting tobacco for good. ¨ Use nicotine gum, patches, or lozenges. ¨ Ask your doctor about stop-smoking programs and medicines. ¨ Keep trying. In addition to reducing your risk of diseases in the future, you will notice some benefits soon after you stop using tobacco. If you have shortness of breath or asthma symptoms, they will likely get better within a few weeks after you quit. · Limit how much alcohol you drink. Moderate amounts of alcohol (up to 2 drinks a day for men, 1 drink a day for women) are okay. But drinking too much can lead to liver problems, high blood pressure, and other health problems. Family health  If you have a family, there are many things you can do together to improve your health. · Eat meals together as a family as often as possible. · Eat healthy foods. This includes fruits, vegetables, lean meats and dairy, and whole grains. · Include your family in your fitness plan. Most people think of activities such as jogging or tennis as the way to fitness, but there are many ways you and your family can be more active. Anything that makes you breathe hard and gets your heart pumping is exercise. Here are some tips:  ¨ Walk to do errands or to take your child to school or the bus. ¨ Go for a family bike ride after dinner instead of watching TV. Where can you learn more? Go to http://maribel-sreedhar.info/.   Enter E257 in the search box to learn more about \"A Healthy Lifestyle: Care Instructions. \"  Current as of: May 12, 2017  Content Version: 11.4  © 3480-9788 Healthwise, [facility]. Care instructions adapted under license by [employer] (which disclaims liability or warranty for this information). If you have questions about a medical condition or this instruction, always ask your healthcare professional. [place] [address] any warranty or liability for your use of this information.

## 2018-03-07 NOTE — PROGRESS NOTES
Date:             2018    Name:  Sheryle Dunn  :  1955  MRN:  [id number]     PCP:  Adams Ritter MD    Chief Complaint   Patient presents with    Follow-up    Neurologic Problem         HISTORY OF PRESENT ILLNESS:  Jonathan Katz is a 65 y.o., female who presents today for follow up for headaches. She was last seen by Dr. Carrillo Westfall in August and he put her on amitriptyline for preventative. She was also complaining of numbness and tingling in both arms and legs, but worse in the legs. Dr. 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 Lab work for causes of this was unremarkable aside from very mild prediabetes. EMG also showed mild compressive neuropathy of the ulnar nerve at the elbow on the right side. CT of the head was normal.  Cervical x-ray showed mild spondylosis C6-C7. Carotid Dopplers with 0-15% stenosis bilaterally. She reports that both headaches and numbness are still present but less severe since going on amitriptyline. She has a headache occasionally, she has a hard time getting rid of her headaches but she doesn't take anything for it. She does not want to take any pills for headache, she will usually just wait it out. She often wakes up with a headache. She does not sleep well, she will only sleep for a few hour and then cannot get back to sleep. Amitriptyline has not helped her to sleep any better. She gets pain in her right hip that shoots down her right leg. She has done PT for similar pain a few years ago, was following with a doctor at [address] [place] [address] for a while for hip pain. She keeps up with the exercises to some degree. She only has these episodes of shooting pain down her leg at work where she is on her feet a lot, never on the weekends. EMG/NCS  Impression: This study shows electrophysiologic evidence of     1).   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 There was no acute denervation changes seen. There is no clear evidence of motor radiculopathy in the arms or legs from the neck or back. This neuropathy would be compatible with various toxic, metabolic or acquired neuropathies. 2). 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 There is no evidence of cervical radiculopathy or lumbar radiculopathy on the basis of this exam.  Clinical correlation recommended, and follow-up studies may be of value following this patient if clinically indicated in 6-12 months time.       8.21.2017 [place] Domingo is a 65 y.o. right-handed  female seen today as a new patient to me for evaluation of a new problem of progressive worsening headaches that come and go almost daily, at the request of Dr. Dillon Workman. Teeth have been getting worse over the last year, and no certain activity, time a day seems to bring him on, they can occur in any part of her head, sometimes generalized sometimes more on the right side, and are not associated with nausea vomiting. She does not have any new weakness or sensory loss with the headaches, but is convinced that something wrong. She has had no recent fever, trauma, infection or meningismus or other precipitating causes for the headaches. She does look a little depressed and admits to chronic insomnia, but says she has no worries.   She had a previous EMG study that suggested some minor abnormalities as far as her ulnar nerves and peroneal nerves, but no clear diagnosis of neuropathy was made 3 years ago. The numbness and tingling seems equal in both arms and legs, worse in the legs in general, but can occur in the arms may be the right side greater than the left as far as the arms. She does have the neck pain. She has had no imaging of the head for the headaches. She has no x-rays of her neck. She does have known minor arthritis of the lumbar spine and a left hip problem that was previously evaluated by orthopedics. Her bowel and bladder function remained stable. She is not diabetic, and does not have a family history of neuropathy or other headache problems in the family. The only medication she really taking is a multivitamin, fish oil and Fosamax. She has no family history of diabetes. No other toxin exposure or other causes of her neuropathy. Current Outpatient Prescriptions   Medication Sig    DOCOSAHEXANOIC ACID/EPA (FISH OIL PO) Take  by mouth.  MULTIVIT-MINERALS/FOLIC ACID (ADULT MULTIVITAMIN GUMMIES PO) Take  by mouth.  amitriptyline (ELAVIL) 10 mg tablet Take 1 Tab by mouth nightly.  alendronate (FOSAMAX) 70 mg tablet      No current facility-administered medications for this visit. No Known Allergies  No past medical history on file. No past surgical history on file. Social History     Social History    Marital status:      Spouse name: N/A    Number of children: N/A    Years of education: N/A     Occupational History    Not on file.      Social History Main Topics    Smoking status: Never Smoker    Smokeless tobacco: Never Used    Alcohol use 0.0 oz/week     0 Standard drinks or equivalent per week      Comment: 1 glass wine per day    Drug use: Not on file    Sexual activity: Not on file     Other Topics Concern    Not on file     Social History Narrative     Family History   Problem Relation Age of Onset    Dementia Father     Stroke Father     Cancer Maternal Aunt     Cancer Paternal Grandmother     Heart Disease Mother     Macular Degen Mother     Kidney Disease Mother      stones         PHYSICAL EXAMINATION:    Visit Vitals    BP 92/62    Pulse 83    Ht 5' 6\" (1.676 m)    Wt 64 kg (141 lb)    SpO2 97%    BMI 22.76 kg/m2     General:  Well defined, nourished, and groomed individual in no acute distress. Neck: Supple, nontender, no bruits, no pain with resistance to active range of motion. Heart: Regular rate and rhythm, no murmurs, rub, or gallop. Normal S1S2. Lungs:  Clear to auscultation bilaterally with equal chest expansion, no cough, no wheeze  Musculoskeletal:  Extremities revealed no edema and had full range of motion of joints. Psych:  Good mood and bright affect    NEUROLOGICAL EXAMINATION:     Mental Status:   Alert and oriented to person, place, and time with recent and remote memory intact. Attention span and concentration are normal. Speech is fluent with a full fund of knowledge. Cranial Nerves:    II, III, IV, VI:  Visual acuity grossly intact. Extra-ocular movements are full and fluid. No ptosis or nystagmus. V-XII: Hearing is grossly intact. Facial features are symmetric, with normal sensation and strength. The palate rises symmetrically and the tongue protrudes midline. Sternocleidomastoids 5/5. Motor Examination: Normal tone, bulk, and strength, 5/5 muscle strength throughout. Coordination:  Finger to nose testing was normal.   No resting or intention tremor  Gait and Station:  Steady while walking and with tandem walking. Normal arm swing. No pronator drift. No muscle wasting or fasciculations noted. ASSESSMENT AND PLAN    ICD-10-CM ICD-9-CM    1. Idiopathic small and large fiber sensory neuropathy G60.8 356.4 amitriptyline (ELAVIL) 10 mg tablet   2. Tension vascular headache G44.209 307.81 amitriptyline (ELAVIL) 10 mg tablet   3.  Pain of right hip joint M25.551 719.45      89year-old female seen in follow-up for headache and numbness and tingling in her hands and feet. Both these are better with amitriptyline, but still present to some degree. EMG showed length dependent neuropathy, but lab work did not show any definitive cause. She has mild prediabetes. She sleeps poorly at night. She also complains of right hip pain, she used to follow with orthopedic provider for that and had injections that were very helpful. 1.  Increase amitriptyline to 20 mg nightly for headache preventative and for neuropathy  2. Follow with orthopedic provider for hip pain, she denies low back pain  3. Continue exercises provided by PT for hip and leg pain  4. Can take NSAID as needed for tension headache    Follow-up in 6 months, call sooner with concerns      Caffie Lites NP    This note was created using voice recognition software. Despite editing, there may be syntax errors.

## 2018-03-07 NOTE — MR AVS SNAPSHOT
[address] [facility], 
[id number], [address] [facility] 
[phone] Patient: Dorine Alpers MRN: [id number] UWF: Visit Information Date & Time Provider Department Dept. Phone Encounter #  
 3/7/2018  2:30 PM Dennis Oseguera, NP [facility] [phone] [id number] Upcoming Health Maintenance Date Due Hepatitis C Screening 1955 LIPID PANEL Q1 1955 FOOT EXAM Q1 1966 MICROALBUMIN Q1 1966 EYE EXAM RETINAL OR DILATED Q1 1966 Pneumococcal 19-64 Medium Risk (1 of 1 - PPSV23) 1975 DTaP/Tdap/Td series (1 - Tdap) 1977 PAP AKA CERVICAL CYTOLOGY 1977 FOBT Q 1 YEAR AGE 50-75 2006 BREAST CANCER SCRN MAMMOGRAM 10/6/2011 ZOSTER VACCINE AGE 60> 2016 Influenza Age 5 to Adult 2017 HEMOGLOBIN A1C Q6M 2018 Allergies as of 3/7/2018  Review Complete On: 3/7/2018 By: Mehdi White LPN No Known Allergies Current Immunizations  Never Reviewed No immunizations on file. Not reviewed this visit You Were Diagnosed With   
  
 Codes Comments Pain of right hip joint    -  Primary ICD-10-CM: M25.551 ICD-9-CM: 719.45 Idiopathic small and large fiber sensory neuropathy     ICD-10-CM: G60.8 ICD-9-CM: 356.4 Tension vascular headache     ICD-10-CM: G44.209 ICD-9-CM: 307.81 Vitals BP Pulse Height(growth percentile) Weight(growth percentile) SpO2 BMI  
 92/62 83 5' 6\" (1.676 m) 141 lb (64 kg) 97% 22.76 kg/m2 Smoking Status Never Smoker Vitals History BMI and BSA Data Body Mass Index Body Surface Area  
 22.76 kg/m 2 1.73 m 2 Preferred Pharmacy Pharmacy Name Phone [id number] [place], [facility] [phone] Your Updated Medication List  
  
   
 This list is accurate as of 3/7/18  2:36 PM.  Always use your most recent med list.  
  
  
  
  
 ADULT MULTIVITAMIN GUMMIES PO Take  by mouth. alendronate 70 mg tablet Commonly known as:  FOSAMAX  
  
 amitriptyline 10 mg tablet Commonly known as:  ELAVIL Take 2 Tabs by mouth nightly. FISH OIL PO Take  by mouth. Prescriptions Sent to Pharmacy Refills  
 amitriptyline (ELAVIL) 10 mg tablet 3 Sig: Take 2 Tabs by mouth nightly. Class: Normal  
 Pharmacy: [id number]- [address]  #: [phone] Route: Oral  
  
Patient Instructions Amitriptyline (Elavil) 10 mg tabs: Take 2 tabs at bedtime PRESCRIPTION REFILL POLICY [facility] Statement to Patients April 1, 2014 In an effort to ensure the large volume of patient prescription refills is processed in the most efficient and expeditious manner, we are asking our patients to assist us by calling your Pharmacy for all prescription refills, this will include also your  Mail Order Pharmacy. The pharmacy will contact our office electronically to continue the refill process. Please do not wait until the last minute to call your pharmacy. We need at least 48 hours (2days) to fill prescriptions. We also encourage you to call your pharmacy before going to  your prescription to make sure it is ready. With regard to controlled substance prescription refill requests (narcotic refills) that need to be picked up at our office, we ask your cooperation by providing us with at least 72 hours (3days) notice that you will need a refill. We will not refill narcotic prescription refill requests after 4:00pm on any weekday, Monday through Thursday, or after 2:00pm on Fridays, or on the weekends.   
  
We encourage everyone to explore another way of getting your prescription refill request processed using CytoViva, our patient web portal through our electronic medical record system. CytoViva is an efficient and effective way to communicate your medication request directly to the office and  downloadable as an margareth on your smart phone . [employer]iva also features a review functionality that allows you to view your medication list as well as leave messages for your physician. Are you ready to get connected? If so please review the attatched instructions or speak to any of our staff to get you set up right away! Thank you so much for your cooperation. Should you have any questions please contact our Practice Administrator. The Physicians and Staff,  [facility] A Healthy Lifestyle: Care Instructions Your Care Instructions A healthy lifestyle can help you feel good, stay at a healthy weight, and have plenty of energy for both work and play. A healthy lifestyle is something you can share with your whole family. A healthy lifestyle also can lower your risk for serious health problems, such as high blood pressure, heart disease, and diabetes. You can follow a few steps listed below to improve your health and the health of your family. Follow-up care is a key part of your treatment and safety. Be sure to make and go to all appointments, and call your doctor if you are having problems. It's also a good idea to know your test results and keep a list of the medicines you take. How can you care for yourself at home? · Do not eat too much sugar, fat, or fast foods. You can still have dessert and treats now and then. The goal is moderation. · Start small to improve your eating habits. Pay attention to portion sizes, drink less juice and soda pop, and eat more fruits and vegetables. ¨ Eat a healthy amount of food. A 3-ounce serving of meat, for example, is about the size of a deck of cards. Fill the rest of your plate with vegetables and whole grains. ¨ Limit the amount of soda and sports drinks you have every day. Drink more water when you are thirsty. ¨ Eat at least 5 servings of fruits and vegetables every day. It may seem like a lot, but it is not hard to reach this goal. A serving or helping is 1 piece of fruit, 1 cup of vegetables, or 2 cups of leafy, raw vegetables. Have an apple or some carrot sticks as an afternoon snack instead of a candy bar. Try to have fruits and/or vegetables at every meal. 
· Make exercise part of your daily routine. You may want to start with simple activities, such as walking, bicycling, or slow swimming. Try to be active 30 to 60 minutes every day. You do not need to do all 30 to 60 minutes all at once. For example, you can exercise 3 times a day for 10 or 20 minutes. Moderate exercise is safe for most people, but it is always a good idea to talk to your doctor before starting an exercise program. 
· Keep moving. Abdiel De La Fuente the lawn, work in the garden, or [employer]. Take the stairs instead of the elevator at work. · If you smoke, quit. People who smoke have an increased risk for heart attack, stroke, cancer, and other lung illnesses. Quitting is hard, but there are ways to boost your chance of quitting tobacco for good. ¨ Use nicotine gum, patches, or lozenges. ¨ Ask your doctor about stop-smoking programs and medicines. ¨ Keep trying. In addition to reducing your risk of diseases in the future, you will notice some benefits soon after you stop using tobacco. If you have shortness of breath or asthma symptoms, they will likely get better within a few weeks after you quit. · Limit how much alcohol you drink. Moderate amounts of alcohol (up to 2 drinks a day for men, 1 drink a day for women) are okay. But drinking too much can lead to liver problems, high blood pressure, and other health problems. Family health If you have a family, there are many things you can do together to improve your health. · Eat meals together as a family as often as possible. · Eat healthy foods. This includes fruits, vegetables, lean meats and dairy, and whole grains. · Include your family in your fitness plan. Most people think of activities such as jogging or tennis as the way to fitness, but there are many ways you and your family can be more active. Anything that makes you breathe hard and gets your heart pumping is exercise. Here are some tips: 
¨ Walk to do errands or to take your child to school or the bus. ¨ Go for a family bike ride after dinner instead of watching TV. Where can you learn more? Go to http://maribelMyMundussreedhar.info/. Enter W726 in the search box to learn more about \"A Healthy Lifestyle: Care Instructions. \" Current as of: May 12, 2017 Content Version: 11.4 © 9478-8104 [employer]ateria. Care instructions adapted under license by [employer] (which disclaims liability or warranty for this information). If you have questions about a medical condition or this instruction, always ask your healthcare professional. [address] any warranty or liability for your use of this information. Introducing [facility]! Dear Leanne Rubi: Thank you for requesting a MobiWork account. Our records indicate that you have previously registered for a MobiWork account but its currently inactive. Please call our MobiWork support line at [phone]. Additional Information If you have questions, please visit the Frequently Asked Questions section of the MobiWork website at [email or web address] Remember, MobiWork is NOT to be used for urgent needs. For medical emergencies, dial 911. Now available from your iPhone and Android! Please provide this summary of care documentation to your next provider. Your primary care clinician is listed as Graeme Clinton.  If you have any questions after today's visit, please call [phone].

## 2019-01-07 ENCOUNTER — TELEPHONE (OUTPATIENT)
Dept: NEUROLOGY | Age: 64
End: 2019-01-07

## 2019-01-07 ENCOUNTER — OFFICE VISIT (OUTPATIENT)
Dept: NEUROLOGY | Age: 64
End: 2019-01-07

## 2019-01-07 VITALS
WEIGHT: 151 LBS | HEIGHT: 66 IN | SYSTOLIC BLOOD PRESSURE: 128 MMHG | DIASTOLIC BLOOD PRESSURE: 78 MMHG | BODY MASS INDEX: 24.27 KG/M2 | HEART RATE: 80 BPM | OXYGEN SATURATION: 98 %

## 2019-01-07 DIAGNOSIS — M43.02 CERVICAL SPONDYLOLYSIS: ICD-10-CM

## 2019-01-07 DIAGNOSIS — M54.2 NECK PAIN: Primary | ICD-10-CM

## 2019-01-07 DIAGNOSIS — R20.2 TINGLING OF RIGHT ARM AND RIGHT SIDE OF FACE: ICD-10-CM

## 2019-01-07 NOTE — TELEPHONE ENCOUNTER
----- Message from Amy Gallegos sent at 1/7/2019 10:43 AM EST -----  Regarding: NP Manfred/Telephone  Pt  Stated she was in this morning and had a few questions regarding  Her appt for ct scan and what she needs to take with her. [phone]. Leave detailed message if unavailable.

## 2019-01-07 NOTE — PATIENT INSTRUCTIONS
Office Policies · Phone calls/patient messages: Please allow up to 24 hours for someone in the office to contact you about your call or message. Be mindful your provider may be out of the office or your message may require further review. We encourage you to use [employer] for your messages as this is a faster, more efficient way to communicate with our office· Medication Refills: 
Prescription medications require up to 48 business hours to process. We encourage you to use [employer] for your refills. For controlled medications: Please allow up to 72 business hours to process. Certain medications may require you to  a written prescription at our office. NO narcotic/controlled medications will be prescribed after 4pm Monday through Friday or on weekends· Form/Paperwork Completion: 
Please note there is a $25 fee for all paperwork completed by our providers. We ask that you allow 7-14 business days. Pre-payment is due prior to picking up/faxing the completed form. You may also download your forms to [employer] to have your doctor print off.

## 2019-01-07 NOTE — PROGRESS NOTES
[facility]rology [facility] at [address] [facility], [address] 
[phone] (D) [phone] (F)   
  
  2019 Name:  Zbigniew Torrez 
:  1955 MRN:  [id number] PCP:  Lurdes Licea MD 
 
Chief Complaint Chief Complaint Patient presents with  Follow-up  
  weakness Brett Gutierrez is a 59 y.o. female who presents for follow up of neck pain. History of Present Illness Patient's last visit was on 3/7/18. At this visit she was being seen for follow up of headaches, neuropathy and right hip pain which she states have all improved or resolved. She stopped taking the Amitriptyline in  due to \"it did not help her sleep\". She denies any current headaches except for occasionally when her right trapezius muscle aches the pain radiates to the right side of her head. Her right hip pain has resolved. The numbness and tingling in her arms and legs also resolved. She was not able to have the Brain MRI that was ordered at her visit on 19 due she was not able to tolerate the items placed on her face at the onset of the test and so it was not done. She states she could only have a MRI done if she were under anesthesia. She did have a normal Head CT on 17. She had an EMG of upper and lower extremities on 17 that showed, Impression: This study shows electrophysiologic evidence of  
1). 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 There was no acute denervation changes seen.   There is no clear evidence of motor radiculopathy in the arms or legs from the neck or back. This neuropathy would be compatible with various toxic, metabolic or acquired neuropathies. 2). 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 There is no evidence of cervical radiculopathy or lumbar radiculopathy on the basis of this exam. 
Clinical correlation recommended, and follow-up studies may be of value following this patient if clinically indicated in 6-12 months time. The patient presents today due to for the past 3 months she has had gradually increasing intermittent, moderate aching pain (4/10 today) and tingling in area of the base of her right trapezius muscle which currently occurs on and off daily. The tingling sometimes radiates to the right side of her neck and right lower face. She also occasionally has similar pain and tingling in the area of the base of her left trapezius muscle. Also, the pain and tinging intermittently radiates into various, random places in her proximal and distal right arm (she can not be any more specific about where in her right arm this occurs or how often). She denies any weakness or numbness. She denies any neck injury. She denies feeling any muscle spasm in her trapezius muscles. She can not identify what worsens her symptoms. She is not taking any medication for her neck pain due to she can not swallow pills. She does not want try any muscle relaxant medication or any other medication for her symptoms at this time. She has never had PT for her neck. She did see a chiropractor \"years ago\". 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 An Xray of her cervical spine on 10/14/17 showed, FINDINGS: AP, lateral, and open mouth odontoid views of the cervical spine were obtained. The alignment is normal.  A swimmer's view was obtained. Vertebral 
body heights are maintained. There is unchanged mild spondylosis at C6-7. Heddie Fairmount There is no fracture or subluxation. The prevertebral soft tissues are normal.  
The odontoid process is intact and the C1-C2 relationship is normal.  
IMPRESSION IMPRESSION: Unchanged mild spondylosis at C6-7. She last saw her PCP about one year ago. She does not have yearly physicals. Current Outpatient Medications Medication Sig  MULTIVIT-MINERALS/FOLIC ACID (ADULT MULTIVITAMIN GUMMIES PO) Take  by mouth.  alendronate (FOSAMAX) 70 mg tablet Take 70 mg by mouth every seven (7) days. No current facility-administered medications for this visit. No Known Allergies History reviewed. No pertinent past medical history. History reviewed. No pertinent surgical history. Social History Socioeconomic History  Marital status:  Spouse name: Not on file  Number of children: Not on file  Years of education: Not on file  Highest education level: Not on file Social Needs  Financial resource strain: Not on file  Food insecurity - worry: Not on file  Food insecurity - inability: Not on file  Transportation needs - medical: Not on file  Transportation needs - non-medical: Not on file Occupational History  Not on file Tobacco Use  Smoking status: Former Smoker  Smokeless tobacco: Never Used Substance and Sexual Activity  Alcohol use: Yes Alcohol/week: 0.0 oz  
  Comment: 1 glass wine per day  Drug use: No  
 Sexual activity: Not on file Other Topics Concern  Not on file Social History Narrative  Not on file Family History Problem Relation Age of Onset  Dementia Father  Stroke Father  Cancer Maternal Aunt  Cancer Paternal Grandmother  Heart Disease Mother  Macular Degen Mother  Kidney Disease Mother   
     stones Review of Systems Review of Systems Constitutional: Negative for chills, fever and weight loss. HENT: Positive for hearing loss (wears hearing aids ). Negative for congestion, sinus pain and tinnitus. Eyes: Negative for blurred vision, double vision, photophobia and pain. Respiratory: Negative for cough and shortness of breath. Cardiovascular: Negative for chest pain and palpitations. Gastrointestinal: Negative for abdominal pain, constipation, diarrhea, nausea and vomiting. Genitourinary: Negative for dysuria and hematuria. Musculoskeletal: Positive for myalgias and neck pain. Negative for back pain and joint pain. Skin: Negative for itching and rash. Neurological: Positive for tingling and headaches (see HPI, no current headache ). Negative for dizziness, tremors, sensory change, speech change, focal weakness, seizures and loss of consciousness. Endo/Heme/Allergies: Does not bruise/bleed easily. Psychiatric/Behavioral: Negative for depression and memory loss. The patient has insomnia (chronic insomnia ). The patient is not nervous/anxious. Physical Exam:  
 
Visit Vitals /78 Pulse 80 Ht 5' 6\" (1.676 m) Wt 151 lb (68.5 kg) SpO2 98% BMI 24.37 kg/m²  
 
  
General Exam 
 
Patient is alert in NAD, appears well nourished and well groomed. HEENT- head is normocephalic, sclera clear,  nose and throat are clear Neck- supple, no carotid bruit, mildly tender to palpation over cervical spine, no tenderness to palpation over trapezius muscles, no spasm palpated Chest - CTA A/P/L, full breath sounds bilaterally Heart - RRR, no murmur Musculoskeletal- normal posture, FROM of joints Extremities - warm and no edema Skin- no rashes or lesions Psychiatric- normal mood and bright affect 
  
Neurological Exam 
 
Alert and oriented to person, place and time. Speech is clear- no aphasia or dysarthria. Recent and remote memory appear intact. Cranial nerves II-XII- visual acuity is grossly intact, Fundoscopic- no papilledema, PERRLA, EOMS intact, no nystagmus, no ptosis, facial sensation normal and masseter strength intact, no facial asymmetry, facial movements are symmetrical and normal strength, hearing grossly intact, palate rises symmetrically, sternocleidomastoid and trapezius strength 5/5 bilaterally, tongue midline. Motor System- strength 5/5 to upper and lower extremities bilaterally, normal muscle bulk and tone, no tremor. Sensation- intact to light touch and temperature throughout. Coordination - finger to nose accurate, LISBET intact, Romberg negative, no pronator drift. Reflexes- 2+ to upper and lower extremities bilaterally. Gait- normal and steady  
  
Assessment and Plan: ICD-10-CM ICD-9-CM 1. Neck pain M54.2 723.1 CT SPINE CERV WO CONT 2. Cervical spondylolysis M43.02 756.19 CT SPINE CERV WO CONT 3. Tingling of right arm and right side of face R20.2 782.0 CT SPINE CERV WO CONT Discussed case with Dr. Elaine Valdovinos who agreed with plan below. 1. Neck pain Will order CT cervical spine. May refer to PT based on results of CT.  
- CT SPINE CERV WO CONT; Future 2. Cervical spondylolysis - CT SPINE CERV WO CONT; Future 3. Intermittent Tingling of right arm and right side of face 
- CT SPINE CERV WO CONT; Future Will first obtain cervical CT scan and then consider EMG. 4. Advised patient to schedule a complete physical with her PCP. 5.  Follow up in one month. Sachin Martínez, NAVARRO-BC This note will not be viewable in [address]

## 2019-01-11 ENCOUNTER — HOSPITAL ENCOUNTER (OUTPATIENT)
Dept: CT IMAGING | Age: 64
Discharge: HOME | End: 2019-01-11
Attending: NURSE PRACTITIONER
Payer: COMMERCIAL

## 2019-01-11 DIAGNOSIS — M54.2 NECK PAIN: ICD-10-CM

## 2019-01-11 DIAGNOSIS — R20.2 TINGLING OF RIGHT ARM AND RIGHT SIDE OF FACE: ICD-10-CM

## 2019-01-11 DIAGNOSIS — M43.02 CERVICAL SPONDYLOLYSIS: ICD-10-CM

## 2019-01-11 PROCEDURE — 72125 CT NECK SPINE W/O DYE: CPT

## 2019-01-14 ENCOUNTER — TELEPHONE (OUTPATIENT)
Dept: NEUROLOGY | Age: 64
End: 2019-01-14

## 2019-01-14 NOTE — TELEPHONE ENCOUNTER
----- Message from Juancarlos Limon sent at 1/14/2019  3:37 PM EST -----  Regarding: NP Manfred/Telephone  Pt is returning call, possibly regarding results of CT Scan    Best contact # [phone]

## 2019-01-14 NOTE — TELEPHONE ENCOUNTER
Called and left message calling to review result test results with patient. Asked patient to call back.

## 2019-01-15 NOTE — TELEPHONE ENCOUNTER
Called patient and informed her of results of her Cervical Spine CT scan done on 1/11/19 which showed  C6-C7 degenerative disc disease. Also informed her that the results were reviewed by Dr. Юлия Fajardo who advised that patient should have an EMG and physical therapy. The patient declined both of these due to [facility] did not show what her problem was in the past\" and she does not think PT will help. Explained to patient the reason for the EMG and why Dr. Юлия Fajardo has advised for her to have one given her new symptoms and change that can happen over time to her nerves. Also explained how PT could help her neck symptoms, but the patient continued to decline a referral to PT.   Advised patient to follow up as planned on 2/11/19, but call back sooner if she decides to have the EMG or PT.

## 2019-02-11 ENCOUNTER — OFFICE VISIT (OUTPATIENT)
Dept: NEUROLOGY | Age: 64
End: 2019-02-11

## 2019-02-11 VITALS
HEIGHT: 66 IN | HEART RATE: 78 BPM | WEIGHT: 152 LBS | BODY MASS INDEX: 24.43 KG/M2 | DIASTOLIC BLOOD PRESSURE: 80 MMHG | SYSTOLIC BLOOD PRESSURE: 140 MMHG | OXYGEN SATURATION: 99 %

## 2019-02-11 DIAGNOSIS — M50.30 DEGENERATIVE DISC DISEASE, CERVICAL: ICD-10-CM

## 2019-02-11 DIAGNOSIS — G60.8 IDIOPATHIC SMALL AND LARGE FIBER SENSORY NEUROPATHY: ICD-10-CM

## 2019-02-11 DIAGNOSIS — R03.0 ELEVATED BLOOD PRESSURE READING: ICD-10-CM

## 2019-02-11 DIAGNOSIS — M54.2 NECK PAIN: Primary | ICD-10-CM

## 2019-02-11 DIAGNOSIS — R20.0 NUMBNESS AND TINGLING OF RIGHT ARM: ICD-10-CM

## 2019-02-11 DIAGNOSIS — G47.00 INSOMNIA, UNSPECIFIED TYPE: ICD-10-CM

## 2019-02-11 DIAGNOSIS — R20.2 NUMBNESS AND TINGLING OF RIGHT ARM: ICD-10-CM

## 2019-02-11 RX ORDER — TRIPROLIDINE/PSEUDOEPHEDRINE 2.5MG-60MG
300 TABLET ORAL
COMMUNITY
End: 2020-03-14

## 2019-02-11 NOTE — PATIENT INSTRUCTIONS

## 2019-02-11 NOTE — PROGRESS NOTES
[facility] at [address] [place] [address], [facility] [place], [address] 
[phone] (K) [phone] (F)   
  
  2019 Name:  Chelsea Spangler 
:  1955 MRN:  [id number] PCP:  Kaykay Petit MD 
 
Chief Complaint Chief Complaint Patient presents with  Follow-up Danisha Levy is a 59 y.o. female who presents for follow up of right sided neck pain. History of Present Illness Patient's last visit was on 19. At the patient's last visit a cervical spine CT was ordered which was completed on 19 and showed degenerative changes at the C1-C2 articulation and degenerative disc changes at C6-C7. The complete report is noted below:  
 
CT Results (most recent): 
Results from Hospital Encounter encounter on 19 CT SPINE CERV WO CONT Narrative EXAM:  CT CERVICAL SPINE WITHOUT CONTRAST INDICATION: Cervicalgia. Neck pain and tingling in the right arm. COMPARISON: None. TECHNIQUE:  
Multislice helical CT of the cervical spine was performed. Sagittal and coronal 
reformations were generated. CT dose reduction was achieved through use of a 
standardized protocol tailored for this examination and automatic exposure 
control for dose modulation. FINDINGS: 
The alignment of the cervical spine is normal.  The odontoid process is intact. There are degenerative changes at the C1-C2 articulation. The craniocervical 
junction is normal. The prevertebral soft tissues are normal. There are 
degenerative disc changes at C6-C7. The findings at each level are as follows:  
 
C2-C3: No abnormality. There is no spinal canal or neural foraminal stenosis. C3-C4: No abnormality. There is no spinal canal or neural foraminal stenosis. C4-C5: No abnormality. There is no spinal canal or neural foraminal stenosis. C5-C6: No abnormality. There is no spinal canal or neural foraminal stenosis. C6-C7: Degenerative disc disease. There is no spinal canal or neural foraminal 
stenosis. C7-T1: No abnormality. There is no spinal canal or neural foraminal stenosis. Impression IMPRESSION: C6-C7 degenerative disc disease. No spinal canal or neural foraminal 
stenosis. The patient's cervical spine CT results from 1/11/19 were reviewed with Dr. Diana Echeverria who advised that the patient should have an EMG and physical therapy. The patient was called on 1/14/19 and was advised that this, however, she declined the EMG at that time and she did not think physical therapy would help so she declined that as well. The patient states today that she does agree to have an EMG done and she also agrees to have physical therapy. The patient reports she has not had physical therapy for several years, and she has never had physical therapy for her neck or right arm issues. Overall, since her last visit, the patient states she feels slightly better. She denies any new symptoms. She currently reports having intermittent daily pain in the area of her right trapezius muscle, she can not describe the pain but states it averages a 5/10  The pain intermittently radiates down her right proximal arm where it feels like a band that gets tight and then releases. The pain in her right proximal arm occurs intermittently with lifting her arm various ways, but then at other times she will lift her arm in the same ways but the pain will not occur. She denies shoulder pain. She also occasionally feels a mild burning feeling that radiates from her right trapezius muscle up the side of right side of her head that last a few seconds. She denies any pain in her right distal arm. Sometimes she feels like her right hand is slightly weak when she tries to  a very small object with her fingers. She denies feeling any weakness in her right hand when she lifts heavier objects.   She denies feeling any weakness in her right arm. She reports a mild intermittent \"numbing feeling or tingling feeling\" in her entire right arm which occurs a few times per days and lasts one or two minutes. She denies any left sided neck pain or any numbness, tingling, weakness, or pain in her left arm. 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 She reports she lifts up to 30 pounds regularly at work. She denies any neck or arm injury. The patient states she cannot swallow pills, and she takes liquid Motrin about 300 mg once per day in the morning as needed for her right neck and right proximal arm discomfort which significantly helps relieve her pain. The patient states she does not want to take any muscle relaxants due to she cannot swallow pills. The patient states for several years she has been having occasional tingling in her distal legs (distal to her knees) and feet which occurs about 10 times per day and last a few seconds each time and then resolves. She reports this usually occurs when she is watching TV at night. She denies any numbness or weakness in her legs. The patient had an EMG done by Dr. Briana Singh on 9/20/17 which showed: Impression: This study shows electrophysiologic evidence of  
1). 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 There was no acute denervation changes seen. There is no clear evidence of motor radiculopathy in the arms or legs from the neck or back. This neuropathy would be compatible with various toxic, metabolic or acquired neuropathies. 2).  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 There is no evidence of cervical radiculopathy or lumbar radiculopathy on the basis of this exam. 
Clinical correlation recommended, and follow-up studies may be of value following this patient if clinically indicated in 6-12 months time. 
  
The patient has a history of having headaches in the past, but at her last visit she reported her headaches had resolved and she again today denies having any headaches. The patient reports having intermittent insomnia for the past 10 years. She has been told that she snores, and several years ago her  told her that she may be pausing when she is breathing at night, but he has not mentioned this since then. The patient reports she goes to bed about 8:30 PM and usually wakes up around 10 PM and has trouble going back to sleep. She is then up again later during the night to urinate. She was on amitriptyline in the past for headaches, but she states it did not help her sleeping. She has never had a sleep study done. The patient states she cannot recall the last time she saw her primary care provider for a complete physical. 
 
Smokingno Alcohol1 glass of wine per day Drugs- no 
 
Current Outpatient Medications Medication Sig  MULTIVIT-MINERALS/FOLIC ACID (ADULT MULTIVITAMIN GUMMIES PO) Take  by mouth.  alendronate (FOSAMAX) 70 mg tablet Take 70 mg by mouth every seven (7) days. No current facility-administered medications for this visit. No Known Allergies History reviewed. No pertinent past medical history. History reviewed. No pertinent surgical history. Social History Socioeconomic History  Marital status:  Spouse name: Not on file  Number of children: Not on file  Years of education: Not on file  Highest education level: Not on file Social Needs  Financial resource strain: Not on file  Food insecurity - worry: Not on file  Food insecurity - inability: Not on file  Transportation needs - medical: Not on file  Transportation needs - non-medical: Not on file Occupational History  Not on file Tobacco Use  Smoking status: Former Smoker  Smokeless tobacco: Never Used Substance and Sexual Activity  Alcohol use: Yes Alcohol/week: 0.0 oz  
  Comment: 1 glass wine per day  Drug use: No  
 Sexual activity: Not on file Other Topics Concern  Not on file Social History Narrative  Not on file Family History Problem Relation Age of Onset  Dementia Father  Stroke Father  Cancer Maternal Aunt  Cancer Paternal Grandmother  Heart Disease Mother  Macular Degen Mother  Kidney Disease Mother   
     stones Review of Systems Review of Systems Constitutional: Negative for chills, fever and malaise/fatigue. HENT: Positive for hearing loss (wears hearing aids ). Negative for tinnitus. Eyes: Negative for blurred vision, double vision, photophobia and pain. Respiratory: Negative for cough and shortness of breath. Cardiovascular: Negative for chest pain and palpitations. Gastrointestinal: Negative for abdominal pain, constipation, diarrhea, nausea and vomiting. Genitourinary: Negative for dysuria and hematuria. Musculoskeletal: Positive for back pain (Has had low back pain in the past, but denies any current back pain), myalgias and neck pain. Negative for falls and joint pain. Skin: Negative for rash. Neurological: Positive for tingling and sensory change. Negative for dizziness, tremors, speech change, focal weakness, seizures, loss of consciousness and headaches. Endo/Heme/Allergies: Does not bruise/bleed easily. Psychiatric/Behavioral: Negative for depression, memory loss, substance abuse and suicidal ideas. The patient is nervous/anxious (Occasionally feels anxious when sitting in traffic or at her job.) and has insomnia (Intermittent trouble sleeping for the past 10 years). Physical Exam:  
 
Visit Vitals /80 Pulse 78 Ht 5' 6\" (1.676 m) Wt 152 lb (68.9 kg) SpO2 99% BMI 24.53 kg/m²  
 
  
General Exam 
 
Patient is alert in NAD, appears well nourished and well groomed. HEENT- head is normocephalic, sclera clear,  nose and throat are clear Neck- supple, no carotid bruit Chest - CTA A/P/L, full breath sounds bilaterally Heart - RRR, no murmur Musculoskeletal- normal posture, right trapezius muscle tender to palpation but no significant spasm palpable, right arm and right shoulder are nontender to palpation, decreased passive internal rotation and mildly decreased passive forward flexion of right shoulder which patient reports is due to pain in muscles around right shoulder Extremities - warm and no edema Psychiatric- normal mood and bright affect 
  
Neurological Exam 
 
Alert and oriented to person, place and time. Speech is clear- no aphasia or dysarthria. Recent and remote memory appear intact. Cranial nerves II-XII- visual acuity and visual fields are grossly intact, PERRLA, EOMS intact, no nystagmus, no ptosis, facial sensation normal and masseter strength intact, no facial asymmetry, facial movements are symmetrical and normal strength, hearing grossly intact, palate rises symmetrically, sternocleidomastoid and trapezius strength 5/5 bilaterally, tongue midline. Motor System- strength 5/5 to upper and lower extremities bilaterally including right arm, wrist, fingers and thumb,  normal muscle bulk and tone, no tremor, Tinel's negative both wrists Sensation- intact to light touch and pinprick throughout. Coordination - finger to nose accurate, LISBET intact, Romberg negative, no pronator drift. Reflexes- 2+ to upper and lower extremities bilaterally. Gait- normal and steady Assessment and Plan: ICD-10-CM ICD-9-CM 1. Neck pain M54.2 723.1 REFERRAL TO PHYSICAL THERAPY REFERRAL TO NEUROLOGY 2. Degenerative disc disease, cervical M50.30 722.4 3. Numbness and tingling of right arm R20.0 782.0 REFERRAL TO PHYSICAL THERAPY  
 R20.2  REFERRAL TO NEUROLOGY 4. 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 Insomnia, unspecified type G47.00 780.52 REFERRAL TO SLEEP STUDIES 6. Elevated blood pressure reading R03.0 796.2 1. Neck pain Patient referred to physical therapy for treatment of her right trapezius muscle pain and which radiates to right proximal arm with decreased range of motion of right shoulder due to muscle pain. - REFERRAL TO PHYSICAL THERAPY 2. Degenerative disc disease, cervical 
Refer to physical therapy as noted above. 3. Numbness and tingling of right arm Patient referred to Dr. Lilo Leon for EMG of right arm. 
- REFERRAL TO NEUROLOGY 4. Idiopathic small and large fiber sensory neuropathy -patient continues to complain of intermittent tingling in her distal lower extremities and feet. Patient referred to Dr. Lilo Leon for EMG of lower extremities. 5. Insomnia, unspecified type Patient referred to sleep medicine for evaluation of insomnia and possible sleep apnea. - REFERRAL TO SLEEP STUDIES 6. Elevated blood pressure reading. Discussed with patient that her blood pressure is elevated today at 140/80 and advised patient to see her PCP for a complete physical and blood pressure recheck. 7.  Follow up in 2 months. Monet Espinoza, [facility] This note will not be viewable in [address]

## 2019-02-15 ENCOUNTER — DOCUMENTATION ONLY (OUTPATIENT)
Dept: NEUROLOGY | Age: 64
End: 2019-02-15

## 2019-02-22 ENCOUNTER — HOSPITAL ENCOUNTER (OUTPATIENT)
Dept: PHYSICAL THERAPY | Age: 64
Discharge: HOME | End: 2019-02-22
Payer: COMMERCIAL

## 2019-02-22 PROCEDURE — 97162 PT EVAL MOD COMPLEX 30 MIN: CPT

## 2019-02-22 PROCEDURE — 97110 THERAPEUTIC EXERCISES: CPT

## 2019-02-22 PROCEDURE — 97140 MANUAL THERAPY 1/> REGIONS: CPT

## 2019-02-22 NOTE — PROGRESS NOTES
PT INITIAL EVALUATION NOTE 2-15 Patient Name: Celene Shone Date:2019 : 1955 [x]  Patient  Verified Payor: Eulalio Bennett / Plan: Osiris Hopper Se HMO / Product Type: HMO /   
In time: 8:10 AM  Out time: 9:17 AM 
Total Treatment Time (min): 67 minutes Visit #: 1 Treatment Area: Cervicalgia [M54.2] Right arm pain [M79.601] SUBJECTIVE Pain Level (0-10 scale): 6/10 Any medication changes, allergies to medications, adverse drug reactions, diagnosis change, or new procedure performed?: [] No    [x] Yes (see summary sheet for update) Subjective:    
Pt reports that she started to experience pain in the R shoulder and neck that began 2-3 months insidiously. She has pain in the R neck and top of shoulder and will occasionally radiate to the L side, but this is rare. She reports a tingling sensation in the top of the hand (metatarsals 2-4). She denies any numbness or weakness in the UEs. She is R handed. Aggravating factors include: being sedentary for long periods of time. Relieving factors include: movement (work) and muscle rub. She is independent with all ADLs. 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 She is working 37-40hrs/wk; 8hrs/day. She is working fully duties without restrictions. She reports that she notices the pain less often while at work. She does not sleep well, but the shoulder does not seem to be bother her; she sleeps on her sides (R>L) or her back. PMH: L metatarsal fracture in , L trigger finger. She is taking motrin PRN for her pain. OBJECTIVE/EXAMINATION Posture: Moderate forward head and protracted shoulders bilaterally Other Observations:  Pt wears transitional bifocal glasses Neurological: Sensations: Intact to light touch sensation C5-T2 bilaterally Cervical AROM:     
 Flexion: 56 Extension: 18 Side Bending: Right: Mild Left:Mild Rotation: Right: 37  Left: 35 Shoulder AROM:  Right  Left Flexion:  [facility] Abduction:  [facility] 
 ER:   [facility] IR:   [facility]  WFL UPPER QUARTER   MUSCLE STRENGTH 
KEY       R  L 
0 - No Contraction  Shoulder Flexion 4+  4+ 1 - Trace   Shoulder Abduction 4+  4+ 
2 - Poor   Shoulder ER  4  4 
3 - Fair    Shoulder IR  4+  4+ 4 - Good   Elbow Flexion  5  5 
5 - Normal   Elbow Extension 5  5 Wrist Flexion  5  5 Wrist Extension 5  5 Finger ABD/ADD 5  5 MiddleTrapezius 4-  4- Lower Trapezius 4-  4- 
 
Palpation: TTP and increased turgor along UT, levator, scalenes, cervical paraspinals, and suboccipitals bilaterally (R>L) Joint Assessment: Decreased thoracic and cervical PA glides; decreased cervical downslides bilaterally Special Tests:Cervical Compression: (-)   Vieira's Sign: NT Spurling Test: (-) B    IR Lift Off: NT Thibodeaux-Reggie: NT    ER Lag Sign: NT 
  Empty Can: NT    Speed's Test: NT Others: NT     Clonus: NT 
 
 
17 min Therapeutic Exercise:  [x] See flow sheet :  
Rationale: increase ROM and increase strength to improve the patients ability to perform ADLs and work duties with less pain or discomfort. 10 min Manual Therapy:  STM and trigger point release to R UT, scalenes, cervical paraspinals and levator Rationale: decrease pain, increase ROM, increase tissue extensibility and decrease trigger points  to improve the patients ability to perform ADLs and work duties with less pain or discomfort. With 
 [x] TE 
 [] TA 
 [] neuro [x] other: Patient Education: [x] Review HEP [] Progressed/Changed HEP based on:  
[] positioning   [] body mechanics   [] transfers   [] heat/ice application   
[x] other: Pt educated on diagnosis and prognosis with therapy Other Objective/Functional Measures: FOTO 63/100 Pain Level (0-10 scale) post treatment: 6/10 ASSESSMENT:  
  
[x]  See Plan of Care Cindi He, PT 2/22/2019

## 2019-02-22 NOTE — PROGRESS NOTES
[facility] . [address] Josse Ortiz Phone: [phone] Fax: [phone] Plan of Care/Statement of Necessity for Physical Therapy Services  2-15 Patient name: Areli Flores  : 1955  Provider#: [id number] Referral source: Almita Anthony NP Medical/Treatment Diagnosis: Cervicalgia [M54.2] Right arm pain [M79.601] Prior Hospitalization: see medical history Comorbidities: Back pain and sleep dysfunction Prior Level of Function: The patient completed 20 minutes of exercise seldom or never Medications: Verified on Patient Summary List 
 
Start of Care: 19      Onset Date: 2018 The Plan of Care and following information is based on the information from the initial evaluation. Assessment/ key information: The Pt is a pleasant 59year old female who presents to therapy with R UT pain and guarding secondary to poor postural awareness. Based on examination, the Pt presents with restrictions in her pec musculature, restrictions throughout her neck musculature (R>L), decreased cervical and thoracic spinal mobility, decreased scapular strength and stability, decreased core strength and stability, and decreased activity tolerance and endurance. The patient would benefit from skilled physical therapy to help improve the above listed impairments to allow the patient to safely return to their prior level of function with less overall pain or risk of further injury. The patient has a good prognosis with skilled physical therapy.  
 
Evaluation Complexity History MEDIUM  Complexity : 1-2 comorbidities / personal factors will impact the outcome/ POC ; Examination HIGH Complexity : 4+ Standardized tests and measures addressing body structure, function, activity limitation and / or participation in recreation  ;Presentation MEDIUM Complexity : Evolving with changing characteristics ;Clinical Decision Making MEDIUM Complexity : FOTO score of 26-74 Overall Complexity Rating: MEDIUM Problem List: pain affecting function, decrease ROM, decrease strength, decrease ADL/ functional abilitiies, decrease activity tolerance and decrease flexibility/ joint mobility Treatment Plan may include any combination of the following: Therapeutic exercise, Therapeutic activities, Neuromuscular re-education, Physical agent/modality, Manual therapy, Patient education, Self Care training, Functional mobility training and Home safety training Patient / Family readiness to learn indicated by: asking questions and interest 
Persons(s) to be included in education: patient (P) Barriers to Learning/Limitations: None Patient Goal (s): reduce pain Patient Self Reported Health Status: good Rehabilitation Potential: good Short Term Goals: To be accomplished in 3 treatments: 
1. The Pt will be independent and compliant with their HEP. 2. The Pt will report a 50% reduction in their pain with ADLs. Long Term Goals: To be accomplished in 6 treatments: 
1. The Pt will score the MCII on her FOTO survey demonstrating improved overall function (63 to 71 points). 2. The Pt will have >/= 45 degrees of cervical rotation bilaterally to allow the Pt to be able to look over her shoulders while driving with less difficulty. 3. The Pt will be able to lift a 30lb box 5x consecutively with no UT compensation to allow the Pt to be able to perform work duties with less pain or discomfort. Frequency / Duration: Patient to be seen 1 times per week for 6 treatments. Patient/ Caregiver education and instruction: self care, activity modification and exercises 
 
[x]  Plan of care has been reviewed with NEEL Fonseca, PT 2/22/2019  
 
________________________________________________________________________ I certify that the above Therapy Services are being furnished while the patient is under my care. I agree with the treatment plan and certify that this therapy is necessary. [de-identified] Signature:____________________  Date:____________Time: _________

## 2019-03-05 ENCOUNTER — HOSPITAL ENCOUNTER (OUTPATIENT)
Dept: PHYSICAL THERAPY | Age: 64
Discharge: HOME | End: 2019-03-05
Payer: COMMERCIAL

## 2019-03-05 PROCEDURE — 97140 MANUAL THERAPY 1/> REGIONS: CPT

## 2019-03-05 PROCEDURE — 97110 THERAPEUTIC EXERCISES: CPT

## 2019-03-05 NOTE — PROGRESS NOTES
PT DAILY TREATMENT NOTE 2-15    Patient Name: Roslyn Villarreal  Date:3/5/2019  : 1955  [x]  Patient  Verified  Payor: Zve Malika / Plan: [employer] HMO / Product Type: HMO /    In time: 3:30p  Out time: 4:32p  Total Treatment Time (min):  62min  Visit #:  2    Treatment Area: Cervicalgia [M54.2]  Right arm pain [M79.601]    SUBJECTIVE  Pain Level (0-10 scale): 5/10 R upper shoulder/neck  Any medication changes, allergies to medications, adverse drug reactions, diagnosis change, or new procedure performed?: [x] No    [] Yes (see summary sheet for update)  Subjective functional status/changes:   [] No changes reported  Pt reports decreased pain since seeing Neuro MD.  Pt believes it may be due to pillows used in bed. Pt notes (+) HEP compliance. OBJECTIVE  Posture: moderate forward head posture, R shoulder protracted and internally rotated, R humeral head sitting mildly anterior in glenoid fossa,   R/L C/S rotation: 40deg/40deg  RUE Brachial Plexus Neural Tension Test: (+) at 40deg abduction with R elbow extension and R wrist extension    35 min Therapeutic Exercise:  [x] See flow sheet :   Rationale: increase ROM and increase strength to improve the patients ability to perform ADLs and work duties with less pain or discomfort.     27 min Manual Therapy:  RUE neural tracing to release neurofascia of brachial plexus in R upper arm, R axilla, R cervical triangle, and R scalenes; along RUE hold-relax scapular protraction to increase R scapular setting on thoracic cage, followed by supine and seated D2 RUE flex/ext pattern; R wrist extension; soft tissue work/myofascial release along bony contours of R clavicle, bony contours of R coracoid process, R scalenes, R upper trapezius, R posterior serratus, R SCM during R-L cervical rotation   Rationale: decrease pain, increase ROM, increase tissue extensibility and decrease trigger points  to improve the patients ability to perform ADLs and work duties with less pain or discomfort. With   [x] TE   [] TA   [x] neuro   [] other: Patient Education: [x] Review HEP    [x] Progressed/Changed HEP based on: HEP to maintain gains in soft tissue extensibility along R anterior neck/clavicular region  [x] positioning   [x] body mechanics   [] transfers   [] heat/ice application    [] other:      Pain Level (0-10 scale) post treatment: 1/10 R upper shoulder/neck    ASSESSMENT/Changes in Function:   R/L C/S rotation: 55deg/50deg  RUE Brachial Plexus Neural Tension Test: (+) at 165deg abduction with R elbow extension and R wrist extension  Pt demonstrating improved RUE mobility s/p PT treatment session, and reporting increased ease of movement in cervical spine. Pt educated on proper pillow positioning during supine sleeping to reduce placing R shoulder in protracted position. PT also educated on how R sidelying may be impinging R-sided neural structures. HEP updated - pt agrees to perform. Patient will continue to benefit from skilled PT services to modify and progress therapeutic interventions, address functional mobility deficits, address ROM deficits, address strength deficits, analyze and address soft tissue restrictions, analyze and cue movement patterns, analyze and modify body mechanics/ergonomics, assess and modify postural abnormalities and instruct in home and community integration to attain remaining goals. [x]  See Plan of Care  []  See progress note/recertification  []  See Discharge Summary         Progress towards goals / Updated goals:  Short Term Goals: To be accomplished in 3 treatments:  1. The Pt will be independent and compliant with their HEP. - in progress  2. The Pt will report a 50% reduction in their pain with ADLs. - in progress  Long Term Goals: To be accomplished in 6 treatments:  1. The Pt will score the MCII on her FOTO survey demonstrating improved overall function (63 to 71 points).   2. The Pt will have >/= 45 degrees of cervical rotation bilaterally to allow the Pt to be able to look over her shoulders while driving with less difficulty. - in progress  3.  The Pt will be able to lift a 30lb box 5x consecutively with no UT compensation to allow the Pt to be able to perform work duties with less pain or discomfort.     PLAN  [x]  Upgrade activities as tolerated     [x]  Continue plan of care  [x]  Update interventions per flow sheet       []  Discharge due to:_  []  Other:_      Bebo Colunga, PT 3/5/2019

## 2019-03-06 ENCOUNTER — OFFICE VISIT (OUTPATIENT)
Dept: NEUROLOGY | Age: 64
End: 2019-03-06

## 2019-03-06 VITALS — DIASTOLIC BLOOD PRESSURE: 80 MMHG | SYSTOLIC BLOOD PRESSURE: 132 MMHG | OXYGEN SATURATION: 98 % | HEART RATE: 81 BPM

## 2019-03-06 DIAGNOSIS — G60.8 IDIOPATHIC SMALL AND LARGE FIBER SENSORY NEUROPATHY: Primary | ICD-10-CM

## 2019-03-06 DIAGNOSIS — M54.16 LUMBAR BACK PAIN WITH RADICULOPATHY AFFECTING RIGHT LOWER EXTREMITY: ICD-10-CM

## 2019-03-06 DIAGNOSIS — M47.22 CERVICAL RADICULOPATHY DUE TO DEGENERATIVE JOINT DISEASE OF SPINE: ICD-10-CM

## 2019-03-06 DIAGNOSIS — G56.21 ULNAR NEUROPATHY AT ELBOW OF RIGHT UPPER EXTREMITY: ICD-10-CM

## 2019-03-06 NOTE — PROCEDURES
[facility] at [facility]        Tel: [phone] ? Fax: [phone]    ELECTRODIAGNOSTIC REPORT      Test Date:  3/6/2019    Patient: Kwabena Baxter : 1955 Physician: Shana Blevins M.D. Sex: Female  < Ref Phys: Shana Blevins M.D. Technician: Deirdre Evans    Patient History: Patient has a history of right arm pain and neck pain and right leg pain and back pain for EMG study to rule out cervical or lumbar radiculopathy, rule out entrapment neuropathy, without other neuromuscular disease. Patient has neck pain and degenerative arthritis in the cervical spine. Patient's exam shows normal strength and muscle tone and bulk throughout and essentially normal sensory examination to pin, and deep tendon reflexes are slightly hypoactive but symmetric throughout. Patient has normal gait and station are normal cranial nerve function. 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 This is similar to the patient's finding on her previous study about 2 years ago. There was no clear evidence of a lumbar or cervical radiculopathy in either the right arm or right leg, and the rest of the study was essentially normal in all regards. Clinical correlation recommended and correlation with imaging modalities may be of further diagnostic benefit if clinically indicated. Repeat studies also in 6-12 months time may be of further diagnostic benefit if clinically indicated.     EMG & NCV Findings:  Evaluation of the right Fibular motor nerve showed normal distal onset latency (5.8 ms), normal amplitude (1.5 mV), normal conduction velocity (B Fib-Ankle, 40 m/s), and normal conduction velocity (Poplt-B Fib, 53 m/s). The right median motor nerve showed normal distal onset latency (3.9 ms), normal amplitude (5.6 mV), and normal conduction velocity (Elbow-Wrist, 56 m/s). The right tibial motor nerve showed normal distal onset latency (4.5 ms), normal amplitude (15.2 mV), and normal conduction velocity (Knee-Ankle, 40 m/s). The right ulnar motor nerve showed normal distal onset latency (3.0 ms), normal amplitude (9.4 mV), normal conduction velocity (B Elbow-Wrist, 56 m/s), and decreased conduction velocity (A Elbow-B Elbow, 45 m/s). The right median sensory, the right radial sensory, the right sural sensory, and the right ulnar sensory nerves showed normal distal peak latency (R3.2, R2.3, R3.4, R3.3 ms) and normal amplitude (R43.6, R22.0, R15.0, R30.9 µV). The right Sup Fibular sensory nerve showed normal distal peak latency (2.9 ms), normal amplitude (10.2 µV), and normal conduction velocity (Lower leg-Lat ankle, 43 m/s). All F Wave latencies were within normal limits.                 __________________  Christina Marmolejo M.D.        Nerve Conduction Studies  Anti Sensory Summary Table     Stim Site NR Peak (ms) Norm Peak (ms) P-T Amp (µV) Norm P-T Amp Site1 Site2 Dist (cm)   Right Median Anti Sensory (2nd Digit)  34.7°C   Wrist    3.2 <4 43.6 >13 Wrist 2nd Digit 14.0   Right Radial Anti Sensory (Base 1st Digit)  31.9°C   Wrist    2.3 <2.8 22.0 >11 Wrist Base 1st Digit 10.0   Right Sup Fibular Anti Sensory (Lat ankle)  31°C   Lower leg    2.9 <4.6 10.2 >4 Lower leg Lat ankle 10.0   Right Sural Anti Sensory (Lat Mall)  30.4°C   Calf    3.4 <4.5 15.0 >4.0 Calf Lat Mall 14.0   Right Ulnar Anti Sensory (5th Digit)  33.4°C   Wrist    3.3 <4.0 30.9 >9 Wrist 5th Digit 14.0     Motor Summary Table     Stim Site NR Onset (ms) Norm Onset (ms) O-P Amp (mV) Norm O-P Amp P-T Amp (mV) Site1 Site2 Dist (cm) Leobarod (m/s)   Right Fibular Motor (Ext Dig Brev)  31.7°C   Ankle    5.8 <6.5 1.5 >1.1 2.3 Ankle Ext Dig Brev 8.0    B Fib    13.1  1.5  2.2 B Fib Ankle 29.5 40   Poplt    15.0  1.3  2.1 Poplt B Fib 10.0 53   Right Median Motor (Abd Poll Brev)  33.1°C   Wrist    3.9 <4.5 5.6 >4.1 9.1 Wrist Abd Poll Brev 8.0    Elbow    8.2  5.5  9.1 Elbow Wrist 24.0 56   Right Tibial Motor (Abd Rosario Brev)  30.3°C   Ankle    4.5 <6.1 15.2 >1.1 24.5 Ankle Abd Rosario Brev 8.0    Knee    14.2  10.7  17.2 Knee Ankle 38.5 40   Right Ulnar Motor (Abd Dig Minimi)  35.4°C   Wrist    3.0 <3.1 9.4 >7.0 13.7 Wrist Abd Dig Minimi 8.0    B Elbow    6.4  8.9  13.7 B Elbow Wrist 19.0 56   A Elbow    8.6  8.1  12.7 A Elbow B Elbow 10.0 45     F Wave Studies     NR F-Lat (ms) Lat Norm (ms) L-R F-Lat (ms) L-R Lat Norm   Right Tibial (Mrkrs) (Abd Hallucis)  32.3°C      53.43 <56  <5.7   Right Ulnar (Mrkrs) (Abd Dig Min)  32.4°C      20.00 <32  <2.5     EMG     Side Muscle Nerve Root Ins Act Fibs Psw Recrt Duration Amp Poly Comment   Right Abd Poll Brev Median C8-T1 Nml Nml Nml Nml Nml Nml Nml    Right Biceps Musculocut C5-6 Nml Nml Nml Nml Nml Nml Nml    Right 1stDorInt Ulnar C8-T1 Nml Nml Nml Nml Nml Nml Nml    Right Triceps Radial C6-7-8 Nml Nml Nml Nml Nml Nml Nml    Right Deltoid Axillary C5-6 Nml Nml Nml Nml Nml Nml Nml    Right FlexPolLong Median (Ant Int) C7-8 Nml Nml Nml Nml Nml Nml Nml    Right ABD Dig Min Ulnar C8-T1 Nml Nml Nml Nml Nml Nml Nml    Right Lower Cerv Parasp Rami C7,T1 Nml Nml Nml Nml Nml Nml Nml    Right FlexCarpiUln Ulnar C8,T1 Nml Nml Nml Nml Nml Nml Nml    Right BrachioRad Radial C5-6 Nml Nml Nml Nml Nml Nml Nml    Right AntTibialis Dp Br Peron L4-5 Nml Nml Nml Nml Nml Nml Nml    Right MedGastroc Tibial S1-2 Nml Nml Nml Nml Nml Nml Nml    Right VastusLat Femoral L2-4 Nml Nml Nml Nml Nml Nml Nml    Right BicepsFemL Sciatic L5-S2 Nml Nml Nml Nml Nml Nml Nml    Right Peroneus Long   Nml Nml Nml Nml Nml Nml Nml    Right Lower Lumb Parasp Rami L5,S1 Nml Nml Nml Nml Nml Nml Nml          Waveforms:

## 2019-03-06 NOTE — LETTER
3/6/2019 10:41 AM 
 
Patient:  Spencer Bell YOB: 1955 Date of Visit: 3/6/2019 Dear No Recipients: Thank you for referring Ms. Spencer Bell to me for evaluation/treatment. Below are the relevant portions of my assessment and plan of care. EMG dictated in procedure note If you have questions, please do not hesitate to call me. I look forward to following Ms. Ludwig along with you. Sincerely, Karan De Paz MD

## 2019-03-21 ENCOUNTER — APPOINTMENT (OUTPATIENT)
Dept: PHYSICAL THERAPY | Age: 64
End: 2019-03-21
Payer: COMMERCIAL

## 2019-04-03 ENCOUNTER — APPOINTMENT (OUTPATIENT)
Dept: PHYSICAL THERAPY | Age: 64
End: 2019-04-03

## 2019-04-15 ENCOUNTER — OFFICE VISIT (OUTPATIENT)
Dept: NEUROLOGY | Age: 64
End: 2019-04-15

## 2019-04-15 VITALS
SYSTOLIC BLOOD PRESSURE: 124 MMHG | HEART RATE: 77 BPM | WEIGHT: 148 LBS | HEIGHT: 66 IN | OXYGEN SATURATION: 98 % | BODY MASS INDEX: 23.78 KG/M2 | DIASTOLIC BLOOD PRESSURE: 74 MMHG

## 2019-04-15 DIAGNOSIS — M47.22 CERVICAL RADICULOPATHY DUE TO DEGENERATIVE JOINT DISEASE OF SPINE: ICD-10-CM

## 2019-04-15 DIAGNOSIS — G44.209 TENSION VASCULAR HEADACHE: ICD-10-CM

## 2019-04-15 DIAGNOSIS — G56.21 ULNAR NEUROPATHY AT ELBOW OF RIGHT UPPER EXTREMITY: ICD-10-CM

## 2019-04-15 DIAGNOSIS — M54.16 LUMBAR BACK PAIN WITH RADICULOPATHY AFFECTING RIGHT LOWER EXTREMITY: ICD-10-CM

## 2019-04-15 DIAGNOSIS — G60.8 IDIOPATHIC SMALL AND LARGE FIBER SENSORY NEUROPATHY: Primary | ICD-10-CM

## 2019-04-15 RX ORDER — GABAPENTIN 250 MG/5ML
3 SOLUTION ORAL
Qty: 100 ML | Refills: 2 | Status: SHIPPED | OUTPATIENT
Start: 2019-04-15 | End: 2020-03-14

## 2019-04-15 NOTE — PROGRESS NOTES
[facility] at [address], [address] [place] [address]., [address]  [phone] (T) [phone] (F)         04/15/2019    Name:  Kimberly Corbin  :  1955  MRN:  [id number]     PCP:  Richa Bedolla MD    Chief Complaint      Chief Complaint   Patient presents with    Follow-up    Neck Pain    Arm Pain       Kalee Bingham is a 59 y.o. female who presents for follow up of right sided neck pain. History of Present Illness     Kalee Bingham 59 y.o. female presents to the neurology office for management of headache, neck pain, back pain and neuropathic pain in his feet. She did have EMG/nerve conduction study since the last visit which did show a right ulnar neuropathy across the elbow segment. She has had CT scan of the cervical spine on 19 and showed degenerative changes at the C1-C2 articulation and degenerative disc changes at C6-C7. The patient has also had physical therapy since the last visit but that has not helped her much. Smoking-no  Alcohol-1 glass of wine per day  Drugs- no    Current Outpatient Medications   Medication Sig    gabapentin 300 mg/6 mL (6 mL) soln Take 3 mL by mouth nightly.  Arm Brace misc Right ulnar splint. Use it every night.  ibuprofen (CHILDREN'S MOTRIN) 100 mg/5 mL suspension Take 300 mg by mouth daily as needed for Fever.  MULTIVIT-MINERALS/FOLIC ACID (ADULT MULTIVITAMIN GUMMIES PO) Take  by mouth.  alendronate (FOSAMAX) 70 mg tablet Take 70 mg by mouth every seven (7) days. No current facility-administered medications for this visit. No Known Allergies  History reviewed. No pertinent past medical history. History reviewed. No pertinent surgical history.   Social History     Socioeconomic History    Marital status:      Spouse name: Not on file    Number of children: Not on file    Years of education: Not on file    Highest education level: Not on file Occupational History    Not on file   Social Needs    Financial resource strain: Not on file    Food insecurity:     Worry: Not on file     Inability: Not on file    Transportation needs:     Medical: Not on file     Non-medical: Not on file   Tobacco Use    Smoking status: Former Smoker    Smokeless tobacco: Never Used   Substance and Sexual Activity    Alcohol use: Yes     Alcohol/week: 0.0 oz     Comment: 1 glass wine per day     Drug use: No    Sexual activity: Not on file   Lifestyle    Physical activity:     Days per week: Not on file     Minutes per session: Not on file    Stress: Not on file   Relationships    Social connections:     Talks on phone: Not on file     Gets together: Not on file     Attends Evangelical service: Not on file     Active member of club or organization: Not on file     Attends meetings of clubs or organizations: Not on file     Relationship status: Not on file    Intimate partner violence:     Fear of current or ex partner: Not on file     Emotionally abused: Not on file     Physically abused: Not on file     Forced sexual activity: Not on file   Other Topics Concern    Not on file   Social History Narrative    Not on file     Family History   Problem Relation Age of Onset    Dementia Father     Stroke Father     Cancer Maternal Aunt     Cancer Paternal Grandmother     Heart Disease Mother    Benjaman Bark Mother     Kidney Disease Mother         stones         Review of Systems      Review of Systems   Constitutional: Negative for chills, fever and malaise/fatigue. HENT: Positive for hearing loss (wears hearing aids ). Negative for tinnitus. Eyes: Negative for blurred vision, double vision, photophobia and pain. Respiratory: Negative for cough and shortness of breath. Cardiovascular: Negative for chest pain and palpitations. Gastrointestinal: Negative for abdominal pain, constipation, diarrhea, nausea and vomiting.    Genitourinary: Negative for dysuria and hematuria. Musculoskeletal: Positive for back pain (Has had low back pain in the past, but denies any current back pain), myalgias and neck pain. Negative for falls and joint pain. Skin: Negative for rash. Neurological: Positive for tingling and sensory change. Negative for dizziness, tremors, speech change, focal weakness, seizures, loss of consciousness and headaches. Endo/Heme/Allergies: Does not bruise/bleed easily. Psychiatric/Behavioral: Negative for depression, memory loss, substance abuse and suicidal ideas. The patient is nervous/anxious (Occasionally feels anxious when sitting in traffic or at her job.) and has insomnia (Intermittent trouble sleeping for the past 10 years). Physical Exam:     Visit Vitals  /74   Pulse 77   Ht 5' 6\" (1.676 m)   Wt 148 lb (67.1 kg)   SpO2 98%   BMI 23.89 kg/m²        General Exam    Patient is alert in NAD, appears well nourished and well groomed. HEENT- head is normocephalic, sclera clear,  nose and throat are clear  Neck- supple, no carotid bruit   Chest - CTA A/P/L, full breath sounds bilaterally  Heart - RRR, no murmur  Musculoskeletal- normal posture, right trapezius muscle tender to palpation but no significant spasm palpable, right arm and right shoulder are nontender to palpation, decreased passive internal rotation and mildly decreased passive forward flexion of right shoulder which patient reports is due to pain in muscles around right shoulder   Extremities - warm and no edema  Psychiatric- normal mood and bright affect     Neurological Exam    Alert and oriented to person, place and time. Speech is clear- no aphasia or dysarthria. Recent and remote memory appear intact.      Cranial nerves II-XII- visual acuity and visual fields are grossly intact, PERRLA, EOMS intact, no nystagmus, no ptosis, facial sensation normal and masseter strength intact, no facial asymmetry, facial movements are symmetrical and normal strength, hearing grossly intact, palate rises symmetrically, sternocleidomastoid and trapezius strength 5/5 bilaterally, tongue midline. Motor System-5 out of 5 in all 4 extremities  Sensation-decreased vibration sensation distally in the lower extremities  Coordination - finger to nose accurate, LISBET intact, Romberg negative, no pronator drift. Gait- normal and steady         Assessment and Plan:       ICD-10-CM ICD-9-CM    1. Idiopathic small and large fiber sensory neuropathy G60.8 356.4 gabapentin 300 mg/6 mL (6 mL) soln   2. Ulnar neuropathy at elbow of right upper extremity G56.21 354. 2 Arm Brace misc   3. Tension vascular headache G44.209 307.81    4. Lumbar back pain with radiculopathy affecting right lower extremity M54.16 724.4    5. Cervical radiculopathy due to degenerative joint disease of spine M47.22 721.0         1. Neck pain  Patient did undergo physical therapy since the last visit but that has not helped her. I am going to start the patient on gabapentin solution grams p.o. nightly. 2. Numbness and tingling of right arm  The patient did have right ulnar neuropathy across the elbow segment. I am going to prescribe). 3. Idiopathic small and large fiber sensory neuropathy  Prescribed gabapentin and hopefully that will help with the neuropathic pain in her feet. 4. Insomnia, unspecified type  Sleep study was ordered during the last visit.     Follow-up with Dr. Maru Truong

## 2019-04-15 NOTE — PATIENT INSTRUCTIONS
A Healthy Lifestyle: Care Instructions  Your Care Instructions    A healthy lifestyle can help you feel good, stay at a healthy weight, and have plenty of energy for both work and play. A healthy lifestyle is something you can share with your whole family. A healthy lifestyle also can lower your risk for serious health problems, such as high blood pressure, heart disease, and diabetes. You can follow a few steps listed below to improve your health and the health of your family. Follow-up care is a key part of your treatment and safety. Be sure to make and go to all appointments, and call your doctor if you are having problems. It's also a good idea to know your test results and keep a list of the medicines you take. How can you care for yourself at home? · Do not eat too much sugar, fat, or fast foods. You can still have dessert and treats now and then. The goal is moderation. · Start small to improve your eating habits. Pay attention to portion sizes, drink less juice and soda pop, and eat more fruits and vegetables. ? Eat a healthy amount of food. A 3-ounce serving of meat, for example, is about the size of a deck of cards. Fill the rest of your plate with vegetables and whole grains. ? Limit the amount of soda and sports drinks you have every day. Drink more water when you are thirsty. ? Eat at least 5 servings of fruits and vegetables every day. It may seem like a lot, but it is not hard to reach this goal. A serving or helping is 1 piece of fruit, 1 cup of vegetables, or 2 cups of leafy, raw vegetables. Have an apple or some carrot sticks as an afternoon snack instead of a candy bar. Try to have fruits and/or vegetables at every meal.  · Make exercise part of your daily routine. You may want to start with simple activities, such as walking, bicycling, or slow swimming. Try to be active 30 to 60 minutes every day.  You do not need to do all 30 to 60 minutes all at once. For example, you can exercise 3 times a day for 10 or 20 minutes. Moderate exercise is safe for most people, but it is always a good idea to talk to your doctor before starting an exercise program.  · Keep moving. [place] the lawn, work in the garden, or [employer]. Take the stairs instead of the elevator at work. · If you smoke, quit. People who smoke have an increased risk for heart attack, stroke, cancer, and other lung illnesses. Quitting is hard, but there are ways to boost your chance of quitting tobacco for good. ? Use nicotine gum, patches, or lozenges. ? Ask your doctor about stop-smoking programs and medicines. ? Keep trying. In addition to reducing your risk of diseases in the future, you will notice some benefits soon after you stop using tobacco. If you have shortness of breath or asthma symptoms, they will likely get better within a few weeks after you quit. · Limit how much alcohol you drink. Moderate amounts of alcohol (up to 2 drinks a day for men, 1 drink a day for women) are okay. But drinking too much can lead to liver problems, high blood pressure, and other health problems. Family health  If you have a family, there are many things you can do together to improve your health. · Eat meals together as a family as often as possible. · Eat healthy foods. This includes fruits, vegetables, lean meats and dairy, and whole grains. · Include your family in your fitness plan. Most people think of activities such as jogging or tennis as the way to fitness, but there are many ways you and your family can be more active. Anything that makes you breathe hard and gets your heart pumping is exercise. Here are some tips:  ? Walk to do errands or to take your child to school or the bus.  ? Go for a family bike ride after dinner instead of watching TV. Where can you learn more? Go to http://maribel-sreedhar.info/.   Enter W511 in the search box to learn more about \"A Healthy Lifestyle: Care Instructions. \"  Current as of: September 11, 2018  Content Version: 11.9  © 9773-1682 [employer]. Care instructions adapted under license by [employer] (which disclaims liability or warranty for this information). If you have questions about a medical condition or this instruction, always ask your healthcare professional. [facility] any warranty or liability for your use of this information. Office Policies      Paperwork            Any paperwork that needs to be completed has a 3 WEEK turnaround time. Phone calls/patient messages:    · Please allow up to 24 hours for someone in the office to contact you about your call or message. Be mindful your provider may be out of the office or your message may require further review. We encourage you to use [employer] for your messages as this is a faster, more efficient way to communicate with our office           Medication Refills:    · Prescription medications require up to 48 business hours to process. We encourage you to use [employer] for your refills. · For controlled medications: Please allow up to 72 business hours to process. Certain medications may require you to  a written prescription at our office.   · NO narcotic/controlled medications will be prescribed after 4pm Monday through Friday or on weekends

## 2019-04-16 ENCOUNTER — TELEPHONE (OUTPATIENT)
Dept: NEUROLOGY | Age: 64
End: 2019-04-16

## 2019-04-18 NOTE — ANCILLARY DISCHARGE INSTRUCTIONS
[facility] [address] ([address]), [address] [place] Phone: [phone] Fax: [phone] Discharge Summary  2-15 Patient name: Marianela Call  : 1955  Provider#: [id number] Referral source: Renetta Ronquillo NP Medical/Treatment Diagnosis: Cervicalgia [M54.2] Right arm pain [M79.601] Prior Hospitalization: see medical history Comorbidities: See Plan of Care Prior Level of Function:See Plan of Care Medications: Verified on Patient Summary List 
 
Start of Care: 19      Onset Date:2018 Visits from Start of Care: 2     Missed Visits: 0 Reporting Period : 19 to 3/5/19 ASSESSMENT/SUMMARY OF CARE: Pt is discharged today, 2019, as they have stopped attending therapy. Final objective data and outcomes were unable to be obtained. RECOMMENDATIONS: 
[x]Discontinue therapy: []Patient has reached or is progressing toward set goals [x]Patient is non-compliant or has abdicated 
    []Due to lack of appreciable progress towards set goals []Cecilia Ely, ARY 2019

## 2020-03-14 ENCOUNTER — OFFICE VISIT (OUTPATIENT)
Dept: URGENT CARE | Age: 65
End: 2020-03-14

## 2020-03-14 VITALS
OXYGEN SATURATION: 97 % | SYSTOLIC BLOOD PRESSURE: 124 MMHG | BODY MASS INDEX: 22.98 KG/M2 | RESPIRATION RATE: 20 BRPM | TEMPERATURE: 97.9 F | WEIGHT: 143 LBS | HEIGHT: 66 IN | HEART RATE: 79 BPM | DIASTOLIC BLOOD PRESSURE: 68 MMHG

## 2020-03-14 DIAGNOSIS — M25.531 RIGHT WRIST PAIN: Primary | ICD-10-CM

## 2020-03-14 RX ORDER — PREDNISOLONE 15 MG/5ML
SOLUTION ORAL
Qty: 70 ML | Refills: 0 | Status: SHIPPED | OUTPATIENT
Start: 2020-03-14 | End: 2020-07-19

## 2020-03-14 NOTE — PATIENT INSTRUCTIONS
Use thumb spica splint  Wrist/thumb exercise       Thumb : Exercises  Introduction  Here are some examples of exercises for you to try. The exercises may be suggested for a condition or for rehabilitation. Start each exercise slowly. Ease off the exercises if you start to have pain. You will be told when to start these exercises and which ones will work best for you. How to do the exercises  Thumb IP flexion   1. Place your forearm and hand on a table with your affected thumb pointing up. 2. With your other hand, hold your thumb steady just below the joint nearest your thumbnail. 3. Bend the tip of your thumb downward, then straighten it. 4. Repeat 8 to 12 times. 5. Switch hands and repeat steps 1 through 4, even if only one thumb is sore. Thumb MP flexion   1. Place your forearm and hand on a table with your affected thumb pointing up. 2. With your other hand, hold the base of your thumb and palm steady. 3. Bend your thumb downward where it meets your palm, then straighten it. 4. Repeat 8 to 12 times. 5. Switch hands and repeat steps 1 through 4, even if only one thumb is sore. Thumb opposition   1. With your affected hand, point your fingers and thumb straight up. Your wrist should be relaxed, following the line of your fingers and thumb. 2. Touch your affected thumb to each finger, one finger at a time. This will look like an \"okay\" sign, but try to keep your other fingers straight and pointing upward as much as you can. 3. Repeat 8 to 12 times. 4. Switch hands and repeat steps 1 through 3, even if only one thumb is sore. Follow-up care is a key part of your treatment and safety. Be sure to make and go to all appointments, and call your doctor if you are having problems. It's also a good idea to know your test results and keep a list of the medicines you take. Where can you learn more?   Go to http://maribel-sreedhar.info/  Enter S402 in the search box to learn more about \"Thumb Arthritis: Exercises. \"  Current as of: June 26, 2019Content Version: 12.4  © 4619-4969 Healthwise, Incorporated. Care instructions adapted under license by [employer] (which disclaims liability or warranty for this information). If you have questions about a medical condition or this instruction, always ask your healthcare professional. [address] any warranty or liability for your use of this information.

## 2020-03-15 NOTE — PROGRESS NOTES
Wrist Pain   This is a new problem. The current episode started more than 1 week ago. The problem occurs constantly. The problem has not changed since onset. Associated symptoms comments: No injury  Over use at work  Pain on rt hand at base of thumb and lateral wrist . The symptoms are aggravated by bending and twisting. Nothing relieves the symptoms. She has tried nothing for the symptoms. History reviewed. No pertinent past medical history. History reviewed. No pertinent surgical history. Family History   Problem Relation Age of Onset    Dementia Father     Stroke Father     Cancer Maternal Aunt     Cancer Paternal Grandmother     Heart Disease Mother     Macular Degen Mother     Kidney Disease Mother         stones        Social History     Socioeconomic History    Marital status:      Spouse name: Not on file    Number of children: Not on file    Years of education: Not on file    Highest education level: Not on file   Occupational History    Not on file   Social Needs    Financial resource strain: Not on file    Food insecurity     Worry: Not on file     Inability: Not on file   [employer] needs     Medical: Not on file     Non-medical: Not on file   Tobacco Use    Smoking status: Former Smoker    Smokeless tobacco: Never Used   Substance and Sexual Activity    Alcohol use:  Yes     Alcohol/week: 0.0 standard drinks     Comment: 1 glass wine per day     Drug use: No    Sexual activity: Not on file   Lifestyle    Physical activity     Days per week: Not on file     Minutes per session: Not on file    Stress: Not on file   Relationships    Social connections     Talks on phone: Not on file     Gets together: Not on file     Attends Taoist service: Not on file     Active member of club or organization: Not on file     Attends meetings of clubs or organizations: Not on file     Relationship status: Not on file    Intimate partner violence     Fear of current or ex partner: Not on file     Emotionally abused: Not on file     Physically abused: Not on file     Forced sexual activity: Not on file   Other Topics Concern    Not on file   Social History Narrative    Not on file                ALLERGIES: Patient has no known allergies. Review of Systems   Musculoskeletal: Positive for arthralgias. Negative for joint swelling. All other systems reviewed and are negative. Vitals:    03/14/20 1613   BP: 124/68   Pulse: 79   Resp: 20   Temp: 97.9 °F (36.6 °C)   SpO2: 97%   Weight: 143 lb (64.9 kg)   Height: 5' 6\" (1.676 m)       Physical Exam  Vitals signs and nursing note reviewed. Musculoskeletal:      Right wrist: She exhibits decreased range of motion and tenderness. She exhibits no swelling. Left hand: She exhibits decreased range of motion and tenderness (on EPL tendon for thumnb ). She exhibits no bony tenderness and no swelling. Normal sensation noted. Normal strength noted. Hands:          MDM    Procedures        ICD-10-CM ICD-9-CM    1. Right wrist pain M25.531 719.43 XR WRIST RT AP/LAT/OBL MIN 3V     Medications Ordered Today   Medications    prednisoLONE (PRELONE) 15 mg/5 mL syrup     Sig: 10 ml once x 2 days, 7.5 ml once x 2 days, 5 ml once x 2 days and 2.5 ml once x 2 days. Dispense:  70 mL     Refill:  0     Results for orders placed or performed in visit on 03/14/20   XR WRIST RT AP/LAT/OBL MIN 3V    Narrative    EXAM: XR WRIST RT AP/LAT/OBL MIN 3V    INDICATION: Right wrist pain. COMPARISON: None. FINDINGS: Three  views of the right wrist demonstrate no fracture or other acute  osseous or articular abnormality. The soft tissues are within normal limits. Impression    IMPRESSION: No acute abnormality. The patients condition was discussed with the patient and they understand. The patient is to follow up with primary care doctor. If signs and symptoms become worse the pt is to go to the ER.  The patient is to take medications as prescribed.

## 2020-07-19 ENCOUNTER — APPOINTMENT (OUTPATIENT)
Dept: GENERAL RADIOLOGY | Age: 65
End: 2020-07-19
Attending: EMERGENCY MEDICINE
Payer: COMMERCIAL

## 2020-07-19 ENCOUNTER — HOSPITAL ENCOUNTER (EMERGENCY)
Age: 65
Discharge: HOME | End: 2020-07-19
Attending: EMERGENCY MEDICINE
Payer: COMMERCIAL

## 2020-07-19 VITALS
BODY MASS INDEX: 22.99 KG/M2 | DIASTOLIC BLOOD PRESSURE: 89 MMHG | TEMPERATURE: 98.6 F | WEIGHT: 143.08 LBS | SYSTOLIC BLOOD PRESSURE: 175 MMHG | OXYGEN SATURATION: 99 % | RESPIRATION RATE: 16 BRPM | HEIGHT: 66 IN | HEART RATE: 71 BPM

## 2020-07-19 DIAGNOSIS — M65.9 FLEXOR TENOSYNOVITIS OF THUMB: Primary | ICD-10-CM

## 2020-07-19 PROCEDURE — 73110 X-RAY EXAM OF WRIST: CPT

## 2020-07-19 PROCEDURE — 99283 EMERGENCY DEPT VISIT LOW MDM: CPT

## 2020-07-19 RX ORDER — DEXAMETHASONE 2 MG/1
TABLET ORAL
Qty: 24 TAB | Refills: 0 | Status: SHIPPED | OUTPATIENT
Start: 2020-07-19 | End: 2022-02-22 | Stop reason: ALTCHOICE

## 2020-07-19 RX ORDER — DICLOFENAC SODIUM 30 MG/G
GEL TOPICAL 2 TIMES DAILY
Qty: 100 G | Refills: 0 | Status: SHIPPED | OUTPATIENT
Start: 2020-07-19 | End: 2022-02-22 | Stop reason: ALTCHOICE

## 2020-07-19 RX ORDER — HYDROCODONE BITARTRATE AND ACETAMINOPHEN 5; 325 MG/1; MG/1
1 TABLET ORAL
Qty: 10 TAB | Refills: 0 | Status: SHIPPED | OUTPATIENT
Start: 2020-07-19 | End: 2020-07-22

## 2020-07-19 NOTE — DISCHARGE INSTRUCTIONS
Patient Education        Tendon Injury (Tendinopathy): Care Instructions  Your Care Instructions     Tendons are tough, flexible tissues that connect muscle to bone. A tendon can hurt or get torn from overuse or aging, especially tendons in the shoulder, elbow, wrist, hip, knee, or ankle. Tendon injuries may be called tendinopathy or tendinitis. Tendon injuries can occur from any motion you have to repeat in a job, sports, or daily activities. Tennis elbow is one common tendon injury. You can treat most tendon problems with over-the-counter pain medicine, rest, changes in your activities, and physical therapy. Follow-up care is a key part of your treatment and safety. Be sure to make and go to all appointments, and call your doctor if you are having problems. It's also a good idea to know your test results and keep a list of the medicines you take. How can you care for yourself at home? · Rest the sore area. You may have to stop doing the activity that caused the tendon pain for a while. · Take an over-the-counter pain medicine, such as acetaminophen (Tylenol), ibuprofen (Advil, Motrin), or naproxen (Aleve). Read and follow all instructions on the label. · Do not take two or more pain medicines at the same time unless the doctor told you to. Many pain medicines have acetaminophen, which is Tylenol. Too much acetaminophen (Tylenol) can be harmful. · Put ice or a cold pack on the sore area for 10 to 20 minutes at a time. Try to do this every 1 to 2 hours for the next 3 days (when you are awake) or until any swelling goes down. Put a thin cloth between the ice and your skin. · Prop up the sore area on a pillow when you ice it or anytime you sit or lie down during the next 3 days. Try to keep it above the level of your heart. This will help reduce swelling.   · Follow your doctor's advice for wearing and caring for a sling, splint, or cast. In some cases, you may wear one of these for a while to help your tendon heal.  · Follow your doctor's advice for stretching and physical therapy. Gently move your joint through its full range of motion. This will prevent stiffness in your joint. · Go back to your activity slowly. Warm up before and stretch after the activity. You also can try making some changes. For example, if a sport caused your tendon pain, alternate the sport with another activity. If using a tool causes pain, switch hands or change your . Stop the activity if it hurts. After the activity, apply ice to prevent pain and swelling. · Do not smoke. Smoking can slow healing. If you need help quitting, talk to your doctor about stop-smoking programs and medicines. These can increase your chances of quitting for good. When should you call for help? Watch closely for changes in your health, and be sure to contact your doctor if:  · Your pain gets worse. · You do not get better as expected. Where can you learn more? Go to http://www.gray.com/  Enter A157 in the search box to learn more about \"Tendon Injury (Tendinopathy): Care Instructions. \"  Current as of: March 2, 2020               Content Version: 12.5  © 1343-8395 Healthwise, Incorporated. Care instructions adapted under license by [employer] (which disclaims liability or warranty for this information). If you have questions about a medical condition or this instruction, always ask your healthcare professional. [address] any warranty or liability for your use of this information.

## 2020-07-19 NOTE — ED NOTES
Fitting with wrist immobilzer velcro strap right wrist
Patient identified and read over and explained discharge instructions with time for questions by attending MD/PA. Patient has verbalized understanding of discharge instructions.
stated

## 2020-07-20 NOTE — ED PROVIDER NOTES
EMERGENCY DEPARTMENT HISTORY AND PHYSICAL EXAM      Date: 7/19/2020  Patient Name: Maria Teresa Forbes    History of Presenting Illness     Chief Complaint   Patient presents with    Wrist Pain     wrist pain on right for two to 2 1/2 days; no known injury       History Provided By: Patient    HPI: Maria Teresa Forbes, 62 y.o. female presents to the ED with cc of right wrist pain. Pt states she developed pain over the past couple of weeks in her wrist/hand. She had been to Urgent care and told she had sprain, recommended NSAIDS. She has been wearing a splint at bedtime. She has to wear a neoprene sleeve at work as she was to wear latex gloves. 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 The pain is constant moderate pain and becomes severe with certain motions. Currently pain rated 10/10. She has not been using ice and has been taking NSAIDs and pain only worsening, She denies fever/chills. No recent rashes, n/v/d. There are no other complaints, changes, or physical findings at this time. PCP: Liu Chan MD    No current facility-administered medications on file prior to encounter. Current Outpatient Medications on File Prior to Encounter   Medication Sig Dispense Refill    Arm Brace misc Right ulnar splint. Use it every night. 1 Each 0       Past History     Past Medical History:  None    Past Surgical History:  None    Family History:  Family History   Problem Relation Age of Onset    Dementia Father     Stroke Father     Cancer Maternal Aunt     Cancer Paternal Grandmother     Heart Disease Mother     Macular Degen Mother     Kidney Disease Mother         stones       Social History:  Social History     Tobacco Use    Smoking status: Former Smoker    Smokeless tobacco: Never Used   Substance Use Topics    Alcohol use:  Yes     Alcohol/week: 0.0 standard drinks     Comment: 1 glass wine per day     Drug use: No       Allergies:  No Known Allergies      Review of Systems   Review of Systems   Constitutional: Negative. Negative for appetite change, chills, fatigue and fever. HENT: Negative. Negative for congestion, rhinorrhea, sinus pressure and sore throat. Eyes: Negative. Respiratory: Negative. Negative for cough, choking, chest tightness, shortness of breath and wheezing. Cardiovascular: Negative. Negative for chest pain, palpitations and leg swelling. Gastrointestinal: Negative for abdominal pain, constipation, diarrhea, nausea and vomiting. Endocrine: Negative. Genitourinary: Negative. Negative for difficulty urinating, dysuria, flank pain and urgency. Musculoskeletal: Positive for arthralgias (Right thumb/wrist ). Skin: Negative. Neurological: Negative. Negative for dizziness, speech difficulty, weakness, light-headedness, numbness and headaches. Psychiatric/Behavioral: Negative. All other systems reviewed and are negative. Physical Exam   Physical Exam  Vitals signs and nursing note reviewed. Constitutional:       General: She is not in acute distress. Appearance: She is well-developed. She is not diaphoretic. HENT:      Head: Normocephalic and atraumatic. Mouth/Throat:      Mouth: Mucous membranes are moist.      Pharynx: No oropharyngeal exudate. Eyes:      Extraocular Movements: Extraocular movements intact. Conjunctiva/sclera: Conjunctivae normal.      Pupils: Pupils are equal, round, and reactive to light. Neck:      Musculoskeletal: Normal range of motion and neck supple. Vascular: No JVD. Trachea: No tracheal deviation. Cardiovascular:      Rate and Rhythm: Normal rate and regular rhythm. Heart sounds: Normal heart sounds. No murmur. Pulmonary:      Effort: Pulmonary effort is normal. No respiratory distress. Breath sounds: Normal breath sounds. No stridor.  No wheezing or rales. Musculoskeletal:         General: Swelling and tenderness present. Hands:    Skin:     General: Skin is warm and dry. Capillary Refill: Capillary refill takes less than 2 seconds. Neurological:      Mental Status: She is alert and oriented to person, place, and time. Cranial Nerves: No cranial nerve deficit. Comments: No gross motor or sensory deficits    Psychiatric:         Mood and Affect: Mood normal.         Behavior: Behavior normal.         Diagnostic Study Results     Labs -  None    Radiologic Studies -   XR WRIST RT AP/LAT/OBL MIN 3V   Final Result   IMPRESSION:    1. Mild DJD. No acute fracture. CT Results  (Last 48 hours)    None        CXR Results  (Last 48 hours)    None          Medical Decision Making   I am the first provider for this patient. I reviewed the vital signs, available nursing notes, past medical history, past surgical history, family history and social history. Vital Signs-Reviewed the patient's vital signs. Records Reviewed: Nursing Notes, Old Medical Records, Previous Radiology Studies and Previous Laboratory Studies    Provider Notes (Medical Decision Making):   DDx- Erosive arthritis, fx, tenosynovitis of thumb, sprain thumb, wrist sprain    ED Course:   Initial assessment performed. The patients presenting problems have been discussed, and they are in agreement with the care plan formulated and outlined with them. I have encouraged them to ask questions as they arise throughout their visit. Procedure note:  PMS intact pre/post. Pt placed in thumb spica wrist splint by Sam Wang, DO  Tolerated well, no complications, Time 5 minutes. Disposition:  DC home. Diclofenac gel, Norco, Steroids  Printed exercises for Flexor tenosynovitis for thumb      DISCHARGE PLAN:  1.    Discharge Medication List as of 7/19/2020 10:05 AM      START taking these medications    Details   dexAMETHasone (DECADRON) 2 mg tablet Take 10mg on day 1, then 8mg on day 2/3, then 6mg on day 4/5, then 4mg on day 6/7, then 2mg once, Normal, Disp-24 Tab,R-0      HYDROcodone-acetaminophen (Norco) 5-325 mg per tablet Take 1 Tab by mouth every six (6) hours as needed for Pain for up to 3 days. Max Daily Amount: 4 Tabs., Normal, Disp-10 Tab,R-0      diclofenac (SOLARAZE) 3 % topical gel Apply  to affected area two (2) times a day., Normal, Disp-100 g,R-0         CONTINUE these medications which have NOT CHANGED    Details   Arm Brace misc Right ulnar splint. Use it every night., Print, Disp-1 Each, R-0           2. Follow-up Information     Follow up With Specialties Details Why Contact Info    [address], [place] [id number], [address] [facility] Keita MD Hand Surgery   [address]  [address]  [address] [address]  [phone]          3. Return to ED if worse     Diagnosis     Clinical Impression:   1. Flexor tenosynovitis of thumb        Attestations:    Demetra Zamora, DO    Please note that this dictation was completed with [employer], the computer voice recognition software. Quite often unanticipated grammatical, syntax, homophones, and other interpretive errors are inadvertently transcribed by the computer software. Please disregard these errors. Please excuse any errors that have escaped final proofreading. Thank you.

## 2021-09-17 ENCOUNTER — HOSPITAL ENCOUNTER (EMERGENCY)
Age: 66
Discharge: HOME | End: 2021-09-17
Attending: STUDENT IN AN ORGANIZED HEALTH CARE EDUCATION/TRAINING PROGRAM
Payer: COMMERCIAL

## 2021-09-17 VITALS
HEART RATE: 93 BPM | WEIGHT: 141.09 LBS | HEIGHT: 66 IN | RESPIRATION RATE: 18 BRPM | BODY MASS INDEX: 22.68 KG/M2 | OXYGEN SATURATION: 100 % | TEMPERATURE: 97.9 F | DIASTOLIC BLOOD PRESSURE: 81 MMHG | SYSTOLIC BLOOD PRESSURE: 154 MMHG

## 2021-09-17 DIAGNOSIS — G60.8 IDIOPATHIC SMALL AND LARGE FIBER SENSORY NEUROPATHY: ICD-10-CM

## 2021-09-17 DIAGNOSIS — M54.12 CERVICAL RADICULOPATHY: Primary | ICD-10-CM

## 2021-09-17 DIAGNOSIS — F41.1 ANXIETY STATE: ICD-10-CM

## 2021-09-17 PROCEDURE — 93005 ELECTROCARDIOGRAM TRACING: CPT

## 2021-09-17 PROCEDURE — 99282 EMERGENCY DEPT VISIT SF MDM: CPT

## 2021-09-17 RX ORDER — KETOROLAC TROMETHAMINE 30 MG/ML
30 INJECTION, SOLUTION INTRAMUSCULAR; INTRAVENOUS
Status: DISCONTINUED | OUTPATIENT
Start: 2021-09-17 | End: 2021-09-17

## 2021-09-17 RX ORDER — DIAZEPAM 10 MG/2ML
5 INJECTION INTRAMUSCULAR
Status: DISCONTINUED | OUTPATIENT
Start: 2021-09-17 | End: 2021-09-17

## 2021-09-17 RX ORDER — TRIPROLIDINE/PSEUDOEPHEDRINE 2.5MG-60MG
10 TABLET ORAL
Qty: 473 ML | Refills: 0 | Status: SHIPPED | OUTPATIENT
Start: 2021-09-17

## 2021-09-17 RX ORDER — GABAPENTIN 250 MG/5ML
6 SOLUTION ORAL
Qty: 150 ML | Refills: 0 | Status: SHIPPED | OUTPATIENT
Start: 2021-09-17 | End: 2021-10-19 | Stop reason: SDUPTHER

## 2021-09-17 NOTE — ED NOTES
[address] [place] [address] MD at bedside for eval;;    Pt's spouse at bedside for comfort and care;;     [address] [place] [address] MD at bedside for eval;; pt refused to RN to eval the patient;;     1900 - Patient discharge by Luretha Curling MD - pt sent to the front lobby, with strong and steady gait -  Discharge information / home RX / and reasons to return to the ED were reviewed by the doctor.       Pt's spouse at bedside for comfort, care and for ride home;;

## 2021-09-17 NOTE — ED PROVIDER NOTES
EMERGENCY DEPARTMENT HISTORY AND PHYSICAL EXAM      Date: 9/17/2021  Patient Name: Taqueria Sheets    History of Presenting Illness     Chief Complaint   Patient presents with    Shoulder Pain     right shoulder pain several months but it was a sudden pain when standing in the kitchen. pain is right shoulder right arm and radiates up to her neck; with pain radiating to top of chest.       History Provided By: Patient and     HPI: Taqueria Sheets, 73 y.o. female with past medical history pertinent for idiopathic small and large fiber sensory neuropathy, cervical radiculopathy due to degenerative joint disease, ulnar neuropathy at elbow of right upper extremity, diabetic peripheral neuropathy associated with type 2 diabetes, lumbar back pain with radiculopathy affecting right lower extremity, presents ambulatory to the ED  with cc of right-sided neck pain that radiates into her right upper extremity. Patient is found screaming, crying, yelling at the top of her lungs in the ED. She is rather uncooperative, angry, and agitated immediately upon my initial introduction, and was therefore difficult to interview. She tells me right away that I should not bother doing any sort of testing or exam on her, as she has had similar symptoms that have been recurring over at least the past 2+ years. States that she has been seen numerous times in the ED, and that despite different imaging and lab work completed on her, \"everything always comes back normal, and nobody can ever tell me what is wrong. \"  She tells me that there has not been any new trauma to current areas of concern prior to onset of her current symptoms. States that there is no change to the characteristic of her pain, but rather they are just a continuation of her chronic symptoms.   Her  shares with me that she works full-time, and that her job requires lots of repetitive motions and she often carries heavy packages with her arms-which could be contributing to her worsening symptoms at this time. Patient reports her  was the one who made her come in, and that she herself does not want to be here. She tells me that she wants to be discharged at this time, and will not accept any treatments here as \"nothing ever helps\" and \"it's not going to make a difference anyway. \"  Patient is right-hand dominant. Her recurrent symptoms over the years have been isolated to the right upper extremity. Further history difficult to obtain as patient is increasingly more annoyed and belligerent with further questioning. PCP: Shreya Robison MD    No current facility-administered medications on file prior to encounter. Current Outpatient Medications on File Prior to Encounter   Medication Sig Dispense Refill    dexAMETHasone (DECADRON) 2 mg tablet Take 10mg on day 1, then 8mg on day 2/3, then 6mg on day 4/5, then 4mg on day 6/7, then 2mg once 24 Tab 0    diclofenac (SOLARAZE) 3 % topical gel Apply  to affected area two (2) times a day. 100 g 0    Arm Brace misc Right ulnar splint. Use it every night. 1 Each 0       Past History     Past Medical History:  No past medical history on file. Past Surgical History:  No past surgical history on file. Family History:  Family History   Problem Relation Age of Onset    Dementia Father     Stroke Father     Cancer Maternal Aunt     Cancer Paternal Grandmother     Heart Disease Mother    Casimer Sidle Mother     Kidney Disease Mother         stones       Social History:  Social History     Tobacco Use    Smoking status: Former Smoker    Smokeless tobacco: Never Used   Substance Use Topics    Alcohol use: Yes     Alcohol/week: 0.0 standard drinks     Comment: 1 glass wine per day     Drug use: No       Allergies:  No Known Allergies      Review of Systems   Review of Systems   Constitutional: Negative for chills and fever. HENT: Negative for congestion and rhinorrhea.     Eyes: Negative for visual disturbance. Respiratory: Negative for chest tightness and shortness of breath. Cardiovascular: Negative for chest pain and palpitations. Gastrointestinal: Negative for abdominal pain, diarrhea, nausea and vomiting. Genitourinary: Negative for dysuria, flank pain and hematuria. Musculoskeletal: Positive for arthralgias and myalgias. Negative for back pain and neck pain. Sharp shooting, numbness and tingling pain radiating from right side of her neck to the distal aspects of her right fingertips   Skin: Negative for rash. Allergic/Immunologic: Negative for immunocompromised state. Neurological: Negative for dizziness, speech difficulty, weakness and headaches. Hematological: Negative for adenopathy. Psychiatric/Behavioral: Negative for dysphoric mood and suicidal ideas. Physical Exam   Physical Exam  Vitals and nursing note reviewed. Constitutional:       General: She is not in acute distress. Appearance: She is not ill-appearing or toxic-appearing. HENT:      Head: Normocephalic and atraumatic. Nose: Nose normal.      Mouth/Throat:      Mouth: Mucous membranes are moist.   Eyes:      Extraocular Movements: Extraocular movements intact. Pupils: Pupils are equal, round, and reactive to light. Cardiovascular:      Rate and Rhythm: Normal rate and regular rhythm. Pulses: Normal pulses. Pulmonary:      Effort: Pulmonary effort is normal.      Breath sounds: No stridor. No wheezing or rhonchi. Abdominal:      General: Abdomen is flat. There is no distension. Tenderness: There is no abdominal tenderness. Musculoskeletal:         General: Normal range of motion. Cervical back: Normal range of motion and neck supple. Comments: Patient is violently and actively flailing and moving all 4 extremities through full ranges of motion without signs of pain or limitations or weakness.   She is also rotating her neck back and forth, without signs of pain, or loss in range of motion at the cervical spine. Skin:     General: Skin is warm and dry. Neurological:      General: No focal deficit present. Mental Status: She is alert and oriented to person, place, and time. Psychiatric:         Mood and Affect: Mood is anxious. Affect is angry, tearful and inappropriate. Speech: Speech is rapid and pressured. Behavior: Behavior is uncooperative, agitated, aggressive and combative. Judgment: Judgment is impulsive. Diagnostic Study Results     Labs -     Recent Results (from the past 24 hour(s))   EKG, 12 LEAD, INITIAL    Collection Time: 09/17/21  6:16 PM   Result Value Ref Range    Ventricular Rate 85 BPM    Atrial Rate 85 BPM    P-R Interval 134 ms    QRS Duration 78 ms    Q-T Interval 372 ms    QTC Calculation (Bezet) 442 ms    Calculated P Axis 22 degrees    Calculated R Axis 22 degrees    Calculated T Axis 13 degrees    Diagnosis       Normal sinus rhythm  Possible Lateral infarct , age undetermined  When compared with ECG of 27-APR-2005 14:17,  Previous ECG has undetermined rhythm, needs review  T wave amplitude has increased in Lateral leads         Radiologic Studies -   No orders to display     CT Results  (Last 48 hours)    None        CXR Results  (Last 48 hours)    None          Medical Decision Making   I, Tricia Dow MD am the first provider for this patient, and I am the attending of record for this patient encounter. I reviewed the vital signs, available nursing notes, past medical history, past surgical history, family history and social history. Vital Signs-Reviewed the patient's vital signs. Patient Vitals for the past 24 hrs:   Temp Pulse Resp BP SpO2   09/17/21 1758 97.9 °F (36.6 °C) 93 18 (!) 154/81 100 %       EKG interpretation: (Preliminary)  Normal sinus rhythm, nonspecific ST changes without ST elevation or depressions.   QTc normal. EKG interpretation by Tricia Dow MD    Records Reviewed: Nursing Notes and Old Medical Records    Provider Notes (Medical Decision Making):   50-year-old female brought by her  for evaluation of recurrent right neck and right upper extremity pain described as sharp, shooting, associated with numbness and tingling. Per her own report, she has had similar symptoms over at least the past 2+ years with numerous previous evaluations including several imaging studies. Her symptoms today were not preceded by traumatic event or activities with dangerous mechanisms. In the ED, she has been persistently agitated, combative, angry, intermittently tearful, loudly yelling and screaming starting from her initial encounter with the triage nurse, to refusing evaluation with bedside RN, to my encounter with her after getting roomed. Patient demonstrates full range of motion at the neck as well as all 4 extremities as she is actively and violently moving all of her extremities without limitation or weakness. She was not examined with direct hands-on maneuvers due to combativeness and refusal of care. Patient declined any work-up in the ED, which in my opinion would not have changed her diagnosis or her treatment plan as I do not suspect any osseous injury, or emergent neurovascular compromise/pathology. I did order medications to help with patient's pain including IM Toradol as well as IM diazepam.  However she refused all treatment, telling me that nothing ever works. Patient's previous documentations and encounters were reviewed extensively by myself. Noted that patient had very extensive neurology work-up for the same symptoms in 2019, which includes EMG studies. She is diagnosed with DJD of cervical spine with radiculopathy, as well as idiopathic small and large fiber sensory neuropathy. Patient was at the time given prescriptions for gabapentin by neurology for treatment of her a forementioned symptoms.   Previous diagnosis by neurology as well as treatment recommendations including gabapentin was reviewed with the patient as well as . Patient becomes even more agitated and angry at this time, telling me that \"none of this makes any sense\" as she claims she was never informed of any of these findings. States that they never prescribed her gabapentin. Patient states she does not \"trust\" me as how could I suddenly be able to give her diagnosis when nobody has ever previously been able to diagnose her. Patient unwilling to accept the above diagnosis shared with her. She was again offered medication to help with her pain, which she again refused. As patient continues to decline any further evaluation or treatment for her symptoms, I will respect her autonomy to make medical decisions for herself as she has full capacity. I will provide Rx for liquid gabapentin to be used nightly as well as liquid ibuprofen to be used for her pain as patient is unable to swallow pills. I will print out her neurological evaluation documentations as well as diagnoses from 2019 to review at home. Advised to follow up with neurology and her PCP as outpatient for management of her chronic neuropathy symptoms. She is discharged from the ED in stable condition under the care of her . ED Course:   Initial assessment performed. The patient's presenting problems have been discussed, and they are in agreement with the care plan formulated and outlined with them. I have encouraged them to ask questions as they arise throughout their visit. Maty Luo MD      Disposition:  Discharge    DISCHARGE PLAN:  1. Discharge Medication List as of 9/17/2021  6:51 PM      START taking these medications    Details   gabapentin (NEURONTIN) 300 mg/6 mL (6 mL) solution Take 7.68 mL by mouth nightly. Max Daily Amount: 384 mg., Normal, Disp-150 mL, R-0      ibuprofen (ADVIL;MOTRIN) 100 mg/5 mL suspension Take 32 mL by mouth every six (6) hours as needed (pain). , Normal, Disp-473 mL, R-0         CONTINUE these medications which have NOT CHANGED    Details   Arm Brace misc Right ulnar splint. Use it every night., Print, Disp-1 Each, R-0      dexAMETHasone (DECADRON) 2 mg tablet Take 10mg on day 1, then 8mg on day 2/3, then 6mg on day 4/5, then 4mg on day 6/7, then 2mg once, Normal, Disp-24 Tab,R-0      diclofenac (SOLARAZE) 3 % topical gel Apply  to affected area two (2) times a day., Normal, Disp-100 g,R-0           2. Follow-up Information     Follow up With Specialties Details Why Contact Info    Naseem Garcia MD Neurology Schedule an appointment as soon as possible for a visit in 1 week  [address] [facility] [address]  [address] [facility], [address] In 2 days  [address] [address].  [phone]      [facility] EMERGENCY DEPT Emergency Medicine   [address]  [address]   [address] [address] [phone]        3. Return to ED if worse     Diagnosis     Clinical Impression:   1. Cervical radiculopathy    2. Idiopathic small and large fiber sensory neuropathy    3. Anxiety state        Attestations:    Dottie Duran MD    Please note that this dictation was completed with [employer], the computer voice recognition software. Quite often unanticipated grammatical, syntax, homophones, and other interpretive errors are inadvertently transcribed by the computer software. Please disregard these errors. Please excuse any errors that have escaped final proofreading. Thank you.

## 2021-09-17 NOTE — DISCHARGE INSTRUCTIONS
It was a pleasure taking care of you at [facility] Emergency Department today. We know that when you come to [facility], you are entrusting us with your health, comfort, and safety. Our physicians and nurses honor that trust, and we truly appreciate the opportunity to care for you and your loved ones. We also value your feedback. If you receive a survey about your Emergency Department experience today, please fill it out. We care about our patients' feedback, and we listen to what you have to say. Thank you!     Maty Luo MD

## 2021-09-17 NOTE — ED NOTES
Pt is still hysterically crying and wants to go home; her  is asking her to have the tests done; pt is repeating she wants to go home; her  wants her to have a doctor see her.

## 2021-09-18 LAB
ATRIAL RATE: 85 BPM
CALCULATED P AXIS, ECG09: 22 DEGREES
CALCULATED R AXIS, ECG10: 22 DEGREES
CALCULATED T AXIS, ECG11: 13 DEGREES
DIAGNOSIS, 93000: NORMAL
P-R INTERVAL, ECG05: 134 MS
Q-T INTERVAL, ECG07: 372 MS
QRS DURATION, ECG06: 78 MS
QTC CALCULATION (BEZET), ECG08: 442 MS
VENTRICULAR RATE, ECG03: 85 BPM

## 2021-10-15 ENCOUNTER — TELEPHONE (OUTPATIENT)
Dept: NEUROLOGY | Age: 66
End: 2021-10-15

## 2021-10-15 NOTE — TELEPHONE ENCOUNTER
----- Message from Derek Webb sent at 10/15/2021 10:49 AM EDT -----  Regarding: Dr. Bernard Mitchell (if not patient):      Relationship of caller (if not patient):  Vaughn Mccloud contact number(s): [phone]      Name of medication and dosage if known: gabapentin (NEURONTIN) 300 mg/6 mL (6 mL) solution [[id number]]       Is patient out of this medication (yes/no): Yes       Pharmacy name: [facility]    Pharmacy listed in chart? (yes/no): Yes  Pharmacy phone number: Phone:  [phone]  Fax:  [phone]       Details to clarify the request: Refill needed before appt      Derek Webb

## 2021-10-19 DIAGNOSIS — G60.8 IDIOPATHIC SMALL AND LARGE FIBER SENSORY NEUROPATHY: ICD-10-CM

## 2021-10-19 DIAGNOSIS — M54.12 CERVICAL RADICULOPATHY: ICD-10-CM

## 2021-10-19 RX ORDER — GABAPENTIN 250 MG/5ML
6 SOLUTION ORAL
Qty: 150 ML | Refills: 0 | Status: SHIPPED | OUTPATIENT
Start: 2021-10-19 | End: 2022-02-22 | Stop reason: ALTCHOICE

## 2022-02-22 ENCOUNTER — OFFICE VISIT (OUTPATIENT)
Dept: NEUROLOGY | Age: 67
End: 2022-02-22
Payer: COMMERCIAL

## 2022-02-22 VITALS
SYSTOLIC BLOOD PRESSURE: 114 MMHG | BODY MASS INDEX: 22.6 KG/M2 | TEMPERATURE: 97.4 F | HEART RATE: 72 BPM | RESPIRATION RATE: 18 BRPM | WEIGHT: 140 LBS | DIASTOLIC BLOOD PRESSURE: 72 MMHG | OXYGEN SATURATION: 98 %

## 2022-02-22 DIAGNOSIS — M54.16 LUMBAR BACK PAIN WITH RADICULOPATHY AFFECTING LEFT LOWER EXTREMITY: ICD-10-CM

## 2022-02-22 DIAGNOSIS — G56.21 ULNAR NEUROPATHY AT ELBOW OF RIGHT UPPER EXTREMITY: ICD-10-CM

## 2022-02-22 DIAGNOSIS — E11.42 DIABETIC PERIPHERAL NEUROPATHY ASSOCIATED WITH TYPE 2 DIABETES MELLITUS (HCC): ICD-10-CM

## 2022-02-22 DIAGNOSIS — G89.29 CHRONIC RIGHT SHOULDER PAIN: ICD-10-CM

## 2022-02-22 DIAGNOSIS — I65.23 STENOSIS OF BOTH INTERNAL CAROTID ARTERIES: Primary | ICD-10-CM

## 2022-02-22 DIAGNOSIS — G60.8 IDIOPATHIC SMALL AND LARGE FIBER SENSORY NEUROPATHY: ICD-10-CM

## 2022-02-22 DIAGNOSIS — M54.16 LUMBAR BACK PAIN WITH RADICULOPATHY AFFECTING RIGHT LOWER EXTREMITY: ICD-10-CM

## 2022-02-22 DIAGNOSIS — M47.22 CERVICAL RADICULOPATHY DUE TO DEGENERATIVE JOINT DISEASE OF SPINE: ICD-10-CM

## 2022-02-22 DIAGNOSIS — M25.511 CHRONIC RIGHT SHOULDER PAIN: ICD-10-CM

## 2022-02-22 DIAGNOSIS — G44.209 TENSION VASCULAR HEADACHE: ICD-10-CM

## 2022-02-22 PROCEDURE — 99215 OFFICE O/P EST HI 40 MIN: CPT | Performed by: PSYCHIATRY & NEUROLOGY

## 2022-02-22 NOTE — PROGRESS NOTES
Consult  REFERRED BY:  Sandra Messer MD    CHIEF COMPLAINT: Chronic right shoulder pain radiating into her arms, and neck pain, and some pain into the left shoulder, and numbness of her feet and chronic low back pain. Subjective:     Cora Benito is a 73 y.o. right-handed  female, seen in urgent work in basis at the request of Dr. Alverto De Oliveira for evaluation of new problem of increasing right shoulder pain that has been present since worsening in September 2021 at which time she was seen in the emergency room screaming in pain, and she states that she cannot raise her shoulder more than about the shoulder joint because it is so painful. She apparently had a CT scan of the cervical spine done 3 years ago January 2019 that showed mild arthritis, at C6 the worst, but no significant neuroforaminal disease or canal stenosis. She has chronic low back pain and had a CT scan done 8 years ago that was normal of her lumbar spine. 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 Most of her pain is caused by movement of the shoulder however and it. Her pain appears to be a shoulder problem mainly. He has some numbness and tingling in the right arm at times, but no major weakness had no recent injuries. She has been suffering with this pain for several years, followed by a previous neurologist Dr. Tre Whyte. He did an EMG that just shows an ulnar nerve entrapment at the elbow, but no other significant neurologic problem. That was 2 years ago. Her bowel and bladder function is normal and she has chronic hip pain. She seen several orthopedist but cannot remember who she saw in when she saw them. He has not seen one now. She did see Dr. Rama Smith in the past and he gave her a shot of cortisone in her hip but is unsure what happened with her shoulder. Patient refuses therapy because she said it makes her worse. History reviewed.  No pertinent past medical history. No past surgical history on file. Family History   Problem Relation Age of Onset    Dementia Father     Stroke Father     Cancer Maternal Aunt     Cancer Paternal Grandmother     Heart Disease Mother     Macular Degen Mother     Kidney Disease Mother         stones      Social History     Tobacco Use    Smoking status: Former Smoker    Smokeless tobacco: Never Used   Substance Use Topics    Alcohol use: Yes     Alcohol/week: 0.0 standard drinks     Comment: 1 glass wine per day          Current Outpatient Medications:     ibuprofen (ADVIL;MOTRIN) 100 mg/5 mL suspension, Take 32 mL by mouth every six (6) hours as needed (pain). , Disp: 473 mL, Rfl: 0        No Known Allergies   MRI Results (most recent):  No results found for this or any previous visit. No results found for this or any previous visit. Review of Systems:  A comprehensive review of systems was negative except for: Constitutional: positive for fatigue and malaise  Musculoskeletal: positive for myalgias, arthralgias, stiff joints, neck pain and back pain  Neurological: positive for headaches and Right arm pain on range of motion of the shoulder  Behvioral/Psych: positive for anxiety and depression   Vitals:    02/22/22 1409   BP: 114/72   Pulse: 72   Resp: 18   Temp: 97.4 °F (36.3 °C)   TempSrc: Temporal   SpO2: 98%   Weight: 140 lb (63.5 kg)     Objective:     I      NEUROLOGICAL EXAM:    Appearance: The patient is thinly developed and nourished, provides a coherent history and is in no acute distress. Mental Status: Oriented to time, place and person, and the president, cognitive function is normal and speech is fluent and no aphasia or dysarthria. Mood and affect appropriate, but does appear depressed. Cranial Nerves:   Intact visual fields. Fundi are benign, disc are flat, no lesions seen on funduscopy. SHALOM, EOM's full, no nystagmus, no ptosis. Facial sensation is normal. Corneal reflexes are not tested. Facial movement is symmetric. Hearing is normal bilaterally. Palate is midline with normal sternocleidomastoid and trapezius muscles are normal. Tongue is midline. Neck without meningismus or bruits  Temporal arteries are not tender or enlarged  TMJ areas are not tender on palpation   Motor:  4/5 strength in upper and lower proximal and distal muscles, but she does favor the right shoulder and deltoid, because of the pain on resistance. . Normal bulk and tone. No fasciculations. Rapid alternating movement is symmetric and intact bilaterally  Patient cannot raise her right arm above 90 degrees, or abduct the arm. Reflexes:   Deep tendon reflexes 1+/4 and symmetrical.  No babinski or clonus present   Sensory:   Normal to touch, pinprick and vibration and temperature except in both feet to about ankle level. DSS is intact   Gait:  Normal gait for patient's age. Tremor:   No tremor noted. Cerebellar:  No abnormal cerebellar signs present on Romberg and tandem testing and finger-nose-finger exam.   Neurovascular:  Normal heart sounds and regular rhythm, peripheral pulses intact, and no carotid bruits. Assessment:       ICD-10-CM ICD-9-CM    1. Stenosis of both internal carotid arteries  I65.23 433.10 MRI CERV SPINE WO CONT     433.30 REFERRAL TO ORTHOPEDICS   2. Idiopathic small and large fiber sensory neuropathy  G60.8 356.4 MRI CERV SPINE WO CONT      REFERRAL TO ORTHOPEDICS   3. Cervical radiculopathy due to degenerative joint disease of spine  M47.22 721.0 MRI CERV SPINE WO CONT      REFERRAL TO ORTHOPEDICS   4. Diabetic peripheral neuropathy associated with type 2 diabetes mellitus (HCC)  E11.42 250.60 MRI CERV SPINE WO CONT     357.2 REFERRAL TO ORTHOPEDICS   5. Lumbar back pain with radiculopathy affecting right lower extremity  M54.16 724.4 MRI CERV SPINE WO CONT      REFERRAL TO ORTHOPEDICS   6.  Lumbar back pain with radiculopathy affecting left lower extremity  M54.16 724.4 MRI CERV SPINE WO CONT      REFERRAL TO ORTHOPEDICS   7. Ulnar neuropathy at elbow of right upper extremity  G56.21 354.2 MRI CERV SPINE WO CONT      REFERRAL TO ORTHOPEDICS   8. Tension vascular headache  G44.209 307.81 MRI CERV SPINE WO CONT      REFERRAL TO ORTHOPEDICS   9. Chronic right shoulder pain  M25.511 719.41 MRI CERV SPINE WO CONT    G89.29 338.29 REFERRAL TO ORTHOPEDICS     Active Problems:    * No active hospital problems. *      Plan:     Patient with marked limitation of movement of her shoulder, appears to have a frozen shoulder or rotator cuff problem for impingement syndrome in the right shoulder, we will refer her to a shoulder specialist Dr. Hector Michaels to see if he can help her. To make sure there is no cervical radiculopathy, we did order a cervical MRI scan to make sure there is no disc herniation or spinal stenosis causing radicular pain. We did not order an EMG because she had 2 normal EMGs except for mild ulnar nerve compression in the last 4 years. She does not want physical therapy because she says it never helps. She cannot swallow pills but does take liquid Motrin and is to continue that in the interim. She will call for results of the MRI scanner check my chart, we did not x-ray the shoulder but defer that to Dr. eVrn Husain. Complicated case, patient has degenerative arthritis of the feet hands, and hips and knees and back and neck, and should benefit from physical therapy but she refuses to go back because she said it only made her worse. Time spent with the patient and her  examining him in detail, discussing her diagnosis prognosis testing needed and therapeutic options were 55 minutes today in the office. We also reviewed her CT scans personally on the PACS system of her head, upper neck, upper back, and all her medical records ER visits, and previous neurology notes and EMG studies.   At next visit we will try to reevaluate her neuropathy and see if there is any change of progression with time.     Signed By: Herminio Castillo MD     February 22, 2022       CC: Arlyn Tesfaye MD  FAX: [phone]

## 2022-02-22 NOTE — LETTER
2/22/2022    Patient: Marianela Rdz   YOB: 1955   Date of Visit: 2/22/2022     Grecia Sanchez, [address]  [address]  [address] [facility] [address]    Dear Grecia Sanchez MD,      Thank you for referring Ms. Marianela Rdz to [address] [place] [address] for evaluation. My notes for this consultation are attached. Consult  REFERRED BY:  Sarah Quiroz MD    CHIEF COMPLAINT: Chronic right shoulder pain radiating into her arms, and neck pain, and some pain into the left shoulder, and numbness of her feet and chronic low back pain. Subjective:     Marianela Rdz is a 73 y.o. right-handed  female, seen in urgent work in basis at the request of Dr. Spencer Lu for evaluation of new problem of increasing right shoulder pain that has been present since worsening in September 2021 at which time she was seen in the emergency room screaming in pain, and she states that she cannot raise her shoulder more than about the shoulder joint because it is so painful. She apparently had a CT scan of the cervical spine done 3 years ago January 2019 that showed mild arthritis, at C6 the worst, but no significant neuroforaminal disease or canal stenosis. She has chronic low back pain and had a CT scan done 8 years ago that was normal of her lumbar spine. 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 Most of her pain is caused by movement of the shoulder however and it. Her pain appears to be a shoulder problem mainly. He has some numbness and tingling in the right arm at times, but no major weakness had no recent injuries. She has been suffering with this pain for several years, followed by a previous neurologist Dr. Eleonora Barrett. He did an EMG that just shows an ulnar nerve entrapment at the elbow, but no other significant neurologic problem. That was 2 years ago.   Her bowel and bladder function is normal and she has chronic hip pain. She seen several orthopedist but cannot remember who she saw in when she saw them. He has not seen one now. She did see Dr. Brook Javier in the past and he gave her a shot of cortisone in her hip but is unsure what happened with her shoulder. Patient refuses therapy because she said it makes her worse. History reviewed. No pertinent past medical history. No past surgical history on file. Family History   Problem Relation Age of Onset    Dementia Father     Stroke Father     Cancer Maternal Aunt     Cancer Paternal Grandmother     Heart Disease Mother     Macular Degen Mother     Kidney Disease Mother         stones      Social History     Tobacco Use    Smoking status: Former Smoker    Smokeless tobacco: Never Used   Substance Use Topics    Alcohol use: Yes     Alcohol/week: 0.0 standard drinks     Comment: 1 glass wine per day          Current Outpatient Medications:     ibuprofen (ADVIL;MOTRIN) 100 mg/5 mL suspension, Take 32 mL by mouth every six (6) hours as needed (pain). , Disp: 473 mL, Rfl: 0        No Known Allergies   MRI Results (most recent):  No results found for this or any previous visit. No results found for this or any previous visit. Review of Systems:  A comprehensive review of systems was negative except for: Constitutional: positive for fatigue and malaise  Musculoskeletal: positive for myalgias, arthralgias, stiff joints, neck pain and back pain  Neurological: positive for headaches and Right arm pain on range of motion of the shoulder  Behvioral/Psych: positive for anxiety and depression   Vitals:    02/22/22 1409   BP: 114/72   Pulse: 72   Resp: 18   Temp: 97.4 °F (36.3 °C)   TempSrc: Temporal   SpO2: 98%   Weight: 140 lb (63.5 kg)     Objective:     I      NEUROLOGICAL EXAM:    Appearance: The patient is thinly developed and nourished, provides a coherent history and is in no acute distress.    Mental Status: Oriented to time, place and person, and the president, cognitive function is normal and speech is fluent and no aphasia or dysarthria. Mood and affect appropriate, but does appear depressed. Cranial Nerves:   Intact visual fields. Fundi are benign, disc are flat, no lesions seen on funduscopy. SHALOM, EOM's full, no nystagmus, no ptosis. Facial sensation is normal. Corneal reflexes are not tested. Facial movement is symmetric. Hearing is normal bilaterally. Palate is midline with normal sternocleidomastoid and trapezius muscles are normal. Tongue is midline. Neck without meningismus or bruits  Temporal arteries are not tender or enlarged  TMJ areas are not tender on palpation   Motor:  4/5 strength in upper and lower proximal and distal muscles, but she does favor the right shoulder and deltoid, because of the pain on resistance. . Normal bulk and tone. No fasciculations. Rapid alternating movement is symmetric and intact bilaterally  Patient cannot raise her right arm above 90 degrees, or abduct the arm. Reflexes:   Deep tendon reflexes 1+/4 and symmetrical.  No babinski or clonus present   Sensory:   Normal to touch, pinprick and vibration and temperature except in both feet to about ankle level. DSS is intact   Gait:  Normal gait for patient's age. Tremor:   No tremor noted. Cerebellar:  No abnormal cerebellar signs present on Romberg and tandem testing and finger-nose-finger exam.   Neurovascular:  Normal heart sounds and regular rhythm, peripheral pulses intact, and no carotid bruits. Assessment:       ICD-10-CM ICD-9-CM    1. Stenosis of both internal carotid arteries  I65.23 433.10 MRI CERV SPINE WO CONT     433.30 REFERRAL TO ORTHOPEDICS   2. Idiopathic small and large fiber sensory neuropathy  G60.8 356.4 MRI CERV SPINE WO CONT      REFERRAL TO ORTHOPEDICS   3. Cervical radiculopathy due to degenerative joint disease of spine  M47.22 721.0 MRI CERV SPINE WO CONT      REFERRAL TO ORTHOPEDICS   4.  Diabetic peripheral neuropathy associated with type 2 diabetes mellitus (HCC)  E11.42 250.60 MRI CERV SPINE WO CONT     357.2 REFERRAL TO ORTHOPEDICS   5. Lumbar back pain with radiculopathy affecting right lower extremity  M54.16 724.4 MRI CERV SPINE WO CONT      REFERRAL TO ORTHOPEDICS   6. Lumbar back pain with radiculopathy affecting left lower extremity  M54.16 724.4 MRI CERV SPINE WO CONT      REFERRAL TO ORTHOPEDICS   7. Ulnar neuropathy at elbow of right upper extremity  G56.21 354.2 MRI CERV SPINE WO CONT      REFERRAL TO ORTHOPEDICS   8. Tension vascular headache  G44.209 307.81 MRI CERV SPINE WO CONT      REFERRAL TO ORTHOPEDICS   9. Chronic right shoulder pain  M25.511 719.41 MRI CERV SPINE WO CONT    G89.29 338.29 REFERRAL TO ORTHOPEDICS     Active Problems:    * No active hospital problems. *      Plan:     Patient with marked limitation of movement of her shoulder, appears to have a frozen shoulder or rotator cuff problem for impingement syndrome in the right shoulder, we will refer her to a shoulder specialist Dr. Jonathan Lopez to see if he can help her. To make sure there is no cervical radiculopathy, we did order a cervical MRI scan to make sure there is no disc herniation or spinal stenosis causing radicular pain. We did not order an EMG because she had 2 normal EMGs except for mild ulnar nerve compression in the last 4 years. She does not want physical therapy because she says it never helps. She cannot swallow pills but does take liquid Motrin and is to continue that in the interim. She will call for results of the MRI scanner check my chart, we did not x-ray the shoulder but defer that to Dr. Nick Fong. Complicated case, patient has degenerative arthritis of the feet hands, and hips and knees and back and neck, and should benefit from physical therapy but she refuses to go back because she said it only made her worse.   Time spent with the patient and her  examining him in detail, discussing her diagnosis prognosis testing needed and therapeutic options were 55 minutes today in the office. We also reviewed her CT scans personally on the PACS system of her head, upper neck, upper back, and all her medical records ER visits, and previous neurology notes and EMG studies. At next visit we will try to reevaluate her neuropathy and see if there is any change of progression with time. Signed By: Maggie Shepherd MD     February 22, 2022       CC: Leonora Foster MD  FAX: [phone]        If you have questions, please do not hesitate to call me. I look forward to following your patient along with you.       Sincerely,    Maggie Shepherd MD

## 2022-03-02 ENCOUNTER — TELEPHONE (OUTPATIENT)
Dept: NEUROLOGY | Age: 67
End: 2022-03-02

## 2022-03-02 DIAGNOSIS — M54.16 LUMBAR BACK PAIN WITH RADICULOPATHY AFFECTING LEFT LOWER EXTREMITY: Primary | ICD-10-CM

## 2022-03-02 DIAGNOSIS — M47.22 CERVICAL RADICULOPATHY DUE TO DEGENERATIVE JOINT DISEASE OF SPINE: ICD-10-CM

## 2022-03-02 RX ORDER — LORAZEPAM 0.5 MG/1
TABLET ORAL
Qty: 5 TABLET | Refills: 0 | Status: SHIPPED | OUTPATIENT
Start: 2022-03-02

## 2022-03-18 PROBLEM — G44.209 TENSION VASCULAR HEADACHE: Status: ACTIVE | Noted: 2017-08-21

## 2022-03-18 PROBLEM — I67.89 CEREBRAL MICROVASCULAR DISEASE: Status: ACTIVE | Noted: 2017-08-21

## 2022-03-18 PROBLEM — G56.21 ULNAR NEUROPATHY AT ELBOW OF RIGHT UPPER EXTREMITY: Status: ACTIVE | Noted: 2017-09-22

## 2022-03-19 PROBLEM — E53.8 B12 DEFICIENCY: Status: ACTIVE | Noted: 2017-08-21

## 2022-03-19 PROBLEM — I65.23 STENOSIS OF BOTH INTERNAL CAROTID ARTERIES: Status: ACTIVE | Noted: 2017-08-21

## 2022-03-19 PROBLEM — M25.511 CHRONIC RIGHT SHOULDER PAIN: Status: ACTIVE | Noted: 2022-02-22

## 2022-03-19 PROBLEM — E11.42 DIABETIC PERIPHERAL NEUROPATHY ASSOCIATED WITH TYPE 2 DIABETES MELLITUS (HCC): Status: ACTIVE | Noted: 2017-08-21

## 2022-03-19 PROBLEM — M54.16 LUMBAR BACK PAIN WITH RADICULOPATHY AFFECTING LEFT LOWER EXTREMITY: Status: ACTIVE | Noted: 2017-08-21

## 2022-03-19 PROBLEM — G89.29 CHRONIC RIGHT SHOULDER PAIN: Status: ACTIVE | Noted: 2022-02-22

## 2022-03-19 PROBLEM — M47.22 CERVICAL RADICULOPATHY DUE TO DEGENERATIVE JOINT DISEASE OF SPINE: Status: ACTIVE | Noted: 2017-08-21

## 2022-03-19 PROBLEM — F51.04 CHRONIC INSOMNIA: Status: ACTIVE | Noted: 2017-08-21

## 2022-03-19 PROBLEM — M54.16 LUMBAR BACK PAIN WITH RADICULOPATHY AFFECTING RIGHT LOWER EXTREMITY: Status: ACTIVE | Noted: 2017-08-21

## 2022-03-20 PROBLEM — E55.9 VITAMIN D DEFICIENCY: Status: ACTIVE | Noted: 2017-08-21

## 2022-03-20 PROBLEM — G60.8 IDIOPATHIC SMALL AND LARGE FIBER SENSORY NEUROPATHY: Status: ACTIVE | Noted: 2017-08-21

## 2023-05-26 NOTE — TELEPHONE ENCOUNTER
Re Johnson please advise CT scan results she is returning your call Detail Level: Zone Anticipated Starting Dosage (Optional): 40mg Daily Ipledge Number (Optional): [id number] Patient Reported Weight (Optional - Include Units): 106 Alissa Son

## 2023-11-20 ENCOUNTER — OFFICE VISIT (OUTPATIENT)
Age: 68
End: 2023-11-20
Payer: COMMERCIAL

## 2023-11-20 VITALS
WEIGHT: 118.1 LBS | SYSTOLIC BLOOD PRESSURE: 129 MMHG | HEART RATE: 65 BPM | BODY MASS INDEX: 18.98 KG/M2 | TEMPERATURE: 98 F | OXYGEN SATURATION: 97 % | HEIGHT: 66 IN | DIASTOLIC BLOOD PRESSURE: 68 MMHG

## 2023-11-20 DIAGNOSIS — G47.00 INSOMNIA, UNSPECIFIED TYPE: ICD-10-CM

## 2023-11-20 DIAGNOSIS — G47.33 OBSTRUCTIVE SLEEP APNEA (ADULT) (PEDIATRIC): Primary | ICD-10-CM

## 2023-11-20 PROCEDURE — 1123F ACP DISCUSS/DSCN MKR DOCD: CPT | Performed by: INTERNAL MEDICINE

## 2023-11-20 PROCEDURE — 99204 OFFICE O/P NEW MOD 45 MIN: CPT | Performed by: INTERNAL MEDICINE

## 2023-11-20 ASSESSMENT — SLEEP AND FATIGUE QUESTIONNAIRES
HOW LIKELY ARE YOU TO NOD OFF OR FALL ASLEEP IN A CAR, WHILE STOPPED FOR A FEW MINUTES IN TRAFFIC: 0
HOW LIKELY ARE YOU TO NOD OFF OR FALL ASLEEP WHILE SITTING INACTIVE IN A PUBLIC PLACE: 2
HOW LIKELY ARE YOU TO NOD OFF OR FALL ASLEEP WHILE SITTING QUIETLY AFTER LUNCH WITHOUT ALCOHOL: 1
HOW LIKELY ARE YOU TO NOD OFF OR FALL ASLEEP WHEN YOU ARE A PASSENGER IN A CAR FOR AN HOUR WITHOUT A BREAK: 2
ESS TOTAL SCORE: 9
HOW LIKELY ARE YOU TO NOD OFF OR FALL ASLEEP WHILE SITTING AND TALKING TO SOMEONE: 0
HOW LIKELY ARE YOU TO NOD OFF OR FALL ASLEEP WHILE SITTING AND READING: 2
HOW LIKELY ARE YOU TO NOD OFF OR FALL ASLEEP WHILE LYING DOWN TO REST IN THE AFTERNOON WHEN CIRCUMSTANCES PERMIT: 0
NECK CIRCUMFERENCE (INCHES): 13
HOW LIKELY ARE YOU TO NOD OFF OR FALL ASLEEP WHILE WATCHING TV: 2

## 2023-11-20 NOTE — PROGRESS NOTES
[address] [facility] Gwendolyn Arnold, [address]  Tel.  [phone]  Fax. [address] [facility], [address] [place] [address]  Tel.  [phone]  Fax. [phone] [facility], [address] [facility]  Tel.  [phone]  Fax. [phone]         Subjective:      Derrell Miguel is an 80 y.o. female self-referred for evaluation for a sleep disorder. She complains of difficulty falling and staying asleep associated with snoring, periods of not breathing, frustration with her poor sleep quality. Symptoms began several years ago, unchanged since that time. She usually can fall asleep in 60 minutes. Family or house members note snoring, periods of not breathing. She denies falling asleep while at work, driving. Derrell Miguel does wake up frequently at night. She is bothered by waking up too early and left unable to get back to sleep. She actually sleeps about 4 hours at night and wakes up about 5 times during the night. She does work shifts: First Shift. Chago Frazier indicates she does get too little sleep at night. Her bedtime is 0900. She awakens at  . She does not (waking with a gasp or snort) take naps. She takes   naps a week lasting  . She has the following observed behaviors: Loud snoring, Light snoring, Twitching of legs or feet, Pauses in breathing;  . Other remarks:   her father was also slim and he snored terribly  She works at [employer]. On Monday Wednesday Friday she has to get up at 3 AM to be at work at 5 AM.  Other days she will work at 7 AM.  She usually gets home regardless of the workday around 230. She says she typically watches about half hour of TV before taking a shower and putting on pajamas. During the afternoon she takes care of some personal matters and her mother is healthcare needs. She tends to fall asleep in front of the TV in the evenings.   She has established with psychiatry for anxiety and depression, which was a on the advice of her

## 2023-11-20 NOTE — PATIENT INSTRUCTIONS
[address] [facility].Gwendolyn, [address]  Tel.  [phone]  Fax. [address] [facility], [address] [place] [address]  Tel.  [phone]  Fax. [phone] [facility], [address] [facility]  Tel.  [phone]  Fax. [phone]     Sleep Apnea: After Your Visit  Your Care Instructions  Sleep apnea occurs when you frequently stop breathing for 10 seconds or longer during sleep. It can be mild to severe, based on the number of times per hour that you stop breathing or have slowed breathing. Blocked or narrowed airways in your nose, mouth, or throat can cause sleep apnea. Your airway can become blocked when your throat muscles and tongue relax during sleep. Sleep apnea is common, occurring in 1 out of 20 individuals. Individuals having any of the following characteristics should be evaluated and treated right away due to high risk and detrimental consequences from untreated sleep apnea:  Obesity  Congestive Heart failure  Atrial Fibrillation  Uncontrolled Hypertension  Type II Diabetes  Night-time Arrhythmias  Stroke  Pulmonary Hypertension  High-risk Driving Populations (pilots, truck drivers, etc.)  Patients Considering Weight-loss Surgery    How do you know you have sleep apnea? You probably have sleep apnea if you answer 'yes' to 3 or more of the following questions:  S - Have you been told that you Snore? T - Are you often Tired during the day? O - Has anyone Observed you stop breathing while sleeping? P- Do you have (or are being treated for) high blood Pressure? B - Are you obese (Body Mass Index > 35)? A - Is your Age 48years old or older? N - Is your Neck size greater than 16 inches? G - Are you male Gender? A sleep physician can prescribe a breathing device that prevents tissues in the throat from blocking your airway. Or your doctor may recommend using a dental device (oral breathing device) to help keep your airway open.  In some cases, surgery may

## 2023-12-27 ENCOUNTER — HOSPITAL ENCOUNTER (OUTPATIENT)
Facility: HOSPITAL | Age: 68
Discharge: HOME | End: 2023-12-30
Payer: COMMERCIAL

## 2023-12-27 ENCOUNTER — PROCEDURE VISIT (OUTPATIENT)
Age: 68
End: 2023-12-27

## 2023-12-27 DIAGNOSIS — G47.9 SLEEP DIFFICULTIES: Primary | ICD-10-CM

## 2023-12-27 PROCEDURE — G0400 HOME SLEEP TEST/TYPE 4 PORTA: HCPCS | Performed by: INTERNAL MEDICINE

## 2023-12-27 NOTE — PROGRESS NOTES
S> Derrell Miguel is a 80 y.o. female seen today to receive a home sleep testing unit (WatchPAT). Patient was educated on proper hookup and operation of the WatchPAT HST via detailed instruction sheet . Instruction forms with after hours contact and documentation were signed. O>    There were no vitals taken for this visit. A>  1. Obstructive sleep apnea (adult) (pediatric)             P>  General information regarding operations and maintenance of the device was provided. Follow-up appointment was made to return the [facility] HST. She will be contacted once the results have been reviewed. She was asked to contact our office for any problems regarding her home sleep test study.

## 2023-12-29 ENCOUNTER — TELEPHONE (OUTPATIENT)
Age: 68
End: 2023-12-29

## 2023-12-29 DIAGNOSIS — G47.33 OBSTRUCTIVE SLEEP APNEA (ADULT) (PEDIATRIC): Primary | ICD-10-CM

## 2024-01-02 ENCOUNTER — CLINICAL DOCUMENTATION (OUTPATIENT)
Age: 69
End: 2024-01-02

## 2024-01-02 NOTE — TELEPHONE ENCOUNTER
HSAT in r-drive.    Tech to convey results to patient    Watchpat HSAT positive for significant sleep apnea.       AHI 22/hour and lowest oxygen saturation was 85%. We had discussed treatment options at initial consultation. Based on the results of the home sleep apnea test,  a trial of APAP would be an effective mode of therapy. APAP order attached. she should be seen in the sleep disorder center 4-6 weeks after initiating PAP therapy.     Patient should call the office the day she gets set up with new PAP device so we can schedule her for an adherence/compliance visit within 31-90 days of obtaining a new device.   Front staff to Order PAP and call patient and let them know which DME company they should be hearing from after results reviewed with lead support technologist.     she will need a first adherence visit.

## 2024-01-08 ENCOUNTER — CLINICAL DOCUMENTATION (OUTPATIENT)
Age: 69
End: 2024-01-08

## 2024-01-11 ENCOUNTER — TELEPHONE (OUTPATIENT)
Age: 69
End: 2024-01-11

## 2024-01-11 NOTE — TELEPHONE ENCOUNTER
Patient called into the office on 01/11/2024 at 2:45pm requesting a callback to go over sleep study results. Patient would like for the tech to call and give information to her   Ankit Fermin at [phone].

## 2024-01-12 ENCOUNTER — TELEPHONE (OUTPATIENT)
Age: 69
End: 2024-01-12

## 2024-01-22 NOTE — TELEPHONE ENCOUNTER
Left voicemail to review sleep study results.  Front staff to mail sleep study results to the patient.  Thank you.

## 2024-02-06 ENCOUNTER — TELEPHONE (OUTPATIENT)
Age: 69
End: 2024-02-06

## 2024-02-06 NOTE — TELEPHONE ENCOUNTER
Reviewed sleep study results with patient. She expressed understanding and is willing to proceed with a trial of APAP.  Front staff to forward DME order.  Thank you.

## 2025-07-28 SDOH — HEALTH STABILITY: PHYSICAL HEALTH: ON AVERAGE, HOW MANY DAYS PER WEEK DO YOU ENGAGE IN MODERATE TO STRENUOUS EXERCISE (LIKE A BRISK WALK)?: 0 DAYS

## 2025-07-28 SDOH — HEALTH STABILITY: PHYSICAL HEALTH: ON AVERAGE, HOW MANY MINUTES DO YOU ENGAGE IN EXERCISE AT THIS LEVEL?: 0 MIN

## 2025-07-30 ENCOUNTER — OFFICE VISIT (OUTPATIENT)
Age: 70
End: 2025-07-30
Payer: COMMERCIAL

## 2025-07-30 VITALS
BODY MASS INDEX: 19.13 KG/M2 | RESPIRATION RATE: 16 BRPM | HEART RATE: 59 BPM | TEMPERATURE: 98 F | HEIGHT: 66 IN | WEIGHT: 119 LBS | SYSTOLIC BLOOD PRESSURE: 135 MMHG | OXYGEN SATURATION: 97 % | DIASTOLIC BLOOD PRESSURE: 72 MMHG

## 2025-07-30 DIAGNOSIS — Z00.00 ANNUAL PHYSICAL EXAM: Primary | ICD-10-CM

## 2025-07-30 DIAGNOSIS — Z12.31 BREAST CANCER SCREENING BY MAMMOGRAM: ICD-10-CM

## 2025-07-30 DIAGNOSIS — E53.8 LOW SERUM VITAMIN B12: ICD-10-CM

## 2025-07-30 DIAGNOSIS — Z78.0 POST-MENOPAUSAL: ICD-10-CM

## 2025-07-30 DIAGNOSIS — Z11.59 NEED FOR HEPATITIS C SCREENING TEST: ICD-10-CM

## 2025-07-30 DIAGNOSIS — F51.01 PRIMARY INSOMNIA: ICD-10-CM

## 2025-07-30 DIAGNOSIS — L23.9: ICD-10-CM

## 2025-07-30 DIAGNOSIS — Z00.00 ANNUAL PHYSICAL EXAM: ICD-10-CM

## 2025-07-30 LAB
ALBUMIN SERPL-MCNC: 4.2 G/DL (ref 3.5–5.2)
ALBUMIN/GLOB SERPL: 1.7 (ref 1.1–2.2)
ALP SERPL-CCNC: 75 U/L (ref 35–104)
ALT SERPL-CCNC: 15 U/L (ref 10–35)
ANION GAP SERPL CALC-SCNC: 10 MMOL/L (ref 2–14)
AST SERPL-CCNC: 23 U/L (ref 10–35)
BASOPHILS # BLD: 0.06 K/UL (ref 0–0.1)
BASOPHILS NFR BLD: 0.9 % (ref 0–1)
BILIRUB SERPL-MCNC: 0.4 MG/DL (ref 0–1.2)
BUN SERPL-MCNC: 10 MG/DL (ref 8–23)
BUN/CREAT SERPL: 16 (ref 12–20)
CALCIUM SERPL-MCNC: 10.1 MG/DL (ref 8.8–10.2)
CHLORIDE SERPL-SCNC: 106 MMOL/L (ref 98–107)
CHOLEST SERPL-MCNC: 211 MG/DL (ref 0–200)
CO2 SERPL-SCNC: 27 MMOL/L (ref 20–29)
CREAT SERPL-MCNC: 0.6 MG/DL (ref 0.6–1)
DIFFERENTIAL METHOD BLD: NORMAL
EOSINOPHIL # BLD: 0.08 K/UL (ref 0–0.4)
EOSINOPHIL NFR BLD: 1.1 % (ref 0–7)
ERYTHROCYTE [DISTWIDTH] IN BLOOD BY AUTOMATED COUNT: 14 % (ref 11.5–14.5)
EST. AVERAGE GLUCOSE BLD GHB EST-MCNC: 113 MG/DL
GLOBULIN SER CALC-MCNC: 2.5 G/DL (ref 2–4)
GLUCOSE SERPL-MCNC: 86 MG/DL (ref 65–100)
HBA1C MFR BLD: 5.6 % (ref 4–5.6)
HCT VFR BLD AUTO: 43.9 % (ref 35–47)
HCV AB SER QL: NONREACTIVE
HDLC SERPL-MCNC: 93 MG/DL (ref 40–60)
HDLC SERPL: 2.3
HGB BLD-MCNC: 13.7 G/DL (ref 11.5–16)
IMM GRANULOCYTES # BLD AUTO: 0.02 K/UL (ref 0–0.04)
IMM GRANULOCYTES NFR BLD AUTO: 0.3 % (ref 0–0.5)
LDLC SERPL CALC-MCNC: 108 MG/DL
LYMPHOCYTES # BLD: 2.2 K/UL (ref 0.8–3.5)
LYMPHOCYTES NFR BLD: 31.3 % (ref 12–49)
MCH RBC QN AUTO: 28.8 PG (ref 26–34)
MCHC RBC AUTO-ENTMCNC: 31.2 G/DL (ref 30–36.5)
MCV RBC AUTO: 92.4 FL (ref 80–99)
MONOCYTES # BLD: 0.57 K/UL (ref 0–1)
MONOCYTES NFR BLD: 8.1 % (ref 5–13)
NEUTS SEG # BLD: 4.1 K/UL (ref 1.8–8)
NEUTS SEG NFR BLD: 58.3 % (ref 32–75)
NRBC # BLD: 0 K/UL (ref 0–0.01)
NRBC BLD-RTO: 0 PER 100 WBC
PLATELET # BLD AUTO: 347 K/UL (ref 150–400)
PMV BLD AUTO: 10.7 FL (ref 8.9–12.9)
POTASSIUM SERPL-SCNC: 4.6 MMOL/L (ref 3.5–5.1)
PROT SERPL-MCNC: 6.7 G/DL (ref 6.4–8.3)
RBC # BLD AUTO: 4.75 M/UL (ref 3.8–5.2)
SODIUM SERPL-SCNC: 143 MMOL/L (ref 136–145)
TRIGL SERPL-MCNC: 51 MG/DL (ref 0–150)
TSH, 3RD GENERATION: 1.47 UIU/ML (ref 0.27–4.2)
VIT B12 SERPL-MCNC: 676 PG/ML (ref 232–1245)
VLDLC SERPL CALC-MCNC: 10 MG/DL
WBC # BLD AUTO: 7 K/UL (ref 3.6–11)

## 2025-07-30 PROCEDURE — 99397 PER PM REEVAL EST PAT 65+ YR: CPT | Performed by: STUDENT IN AN ORGANIZED HEALTH CARE EDUCATION/TRAINING PROGRAM

## 2025-07-30 RX ORDER — TRAZODONE HYDROCHLORIDE 50 MG/1
50 TABLET ORAL NIGHTLY
Qty: 30 TABLET | Refills: 0 | Status: SHIPPED | OUTPATIENT
Start: 2025-07-30

## 2025-07-30 RX ORDER — SIMETHICONE 80 MG
80 TABLET,CHEWABLE ORAL EVERY 6 HOURS PRN
COMMUNITY

## 2025-07-30 RX ORDER — IBUPROFEN 100 MG/5ML
10 SUSPENSION ORAL EVERY 4 HOURS PRN
COMMUNITY

## 2025-07-30 RX ORDER — CYANOCOBALAMIN (VITAMIN B-12) 1500 MCG
TABLET,CHEWABLE ORAL
COMMUNITY

## 2025-07-30 RX ORDER — ASPIRIN 325 MG
TABLET ORAL
COMMUNITY

## 2025-07-30 RX ORDER — CALCIUM CARBONATE 300MG(750)
TABLET,CHEWABLE ORAL
COMMUNITY

## 2025-07-30 SDOH — ECONOMIC STABILITY: FOOD INSECURITY: WITHIN THE PAST 12 MONTHS, THE FOOD YOU BOUGHT JUST DIDN'T LAST AND YOU DIDN'T HAVE MONEY TO GET MORE.: NEVER TRUE

## 2025-07-30 SDOH — ECONOMIC STABILITY: FOOD INSECURITY: WITHIN THE PAST 12 MONTHS, YOU WORRIED THAT YOUR FOOD WOULD RUN OUT BEFORE YOU GOT MONEY TO BUY MORE.: NEVER TRUE

## 2025-07-30 ASSESSMENT — PATIENT HEALTH QUESTIONNAIRE - PHQ9
SUM OF ALL RESPONSES TO PHQ QUESTIONS 1-9: 1
SUM OF ALL RESPONSES TO PHQ QUESTIONS 1-9: 1
2. FEELING DOWN, DEPRESSED OR HOPELESS: SEVERAL DAYS
SUM OF ALL RESPONSES TO PHQ QUESTIONS 1-9: 1
1. LITTLE INTEREST OR PLEASURE IN DOING THINGS: NOT AT ALL
SUM OF ALL RESPONSES TO PHQ QUESTIONS 1-9: 1

## 2025-07-30 NOTE — PROGRESS NOTES
sleep.   Has been using cpap for about 2 years.   She is unsure if cpap helps with the insomnia.   She has tried OTC meds including melatonin and lavender but has not helped much.     Mentions irritation of the ear lobes. Previously Rxd ointment which she uses but does not remember the name.     Active Ambulatory Problems     Diagnosis Date Noted    Tension vascular headache 08/21/2017    Cerebral microvascular disease 08/21/2017    Ulnar neuropathy at elbow of right upper extremity 09/22/2017    Lumbar back pain with radiculopathy affecting right lower extremity 08/21/2017    Diabetic peripheral neuropathy associated with type 2 diabetes mellitus (HCC) 08/21/2017    Cervical radiculopathy due to degenerative joint disease of spine 08/21/2017    B12 deficiency 08/21/2017    Stenosis of both internal carotid arteries 08/21/2017    Chronic right shoulder pain 02/22/2022    Lumbar back pain with radiculopathy affecting left lower extremity 08/21/2017    Chronic insomnia 08/21/2017    Vitamin D deficiency 08/21/2017    Idiopathic small and large fiber sensory neuropathy 08/21/2017     Resolved Ambulatory Problems     Diagnosis Date Noted    No Resolved Ambulatory Problems     No Additional Past Medical History         SOCIAL HISTORY:  Social History     Tobacco Use    Smoking status: Former     Current packs/day: 0.50     Types: Cigarettes    Smokeless tobacco: Never    Tobacco comments:     Sporatic.   Vaping Use    Vaping status: Never Used   Substance Use Topics    Alcohol use: Yes    Drug use: No           Objective      Last Weight Metrics:      7/30/2025     9:41 AM 11/20/2023     1:54 PM 2/22/2022     2:09 PM 9/17/2021     5:58 PM   Weight Loss Metrics   Height 5' 5.984\" 5' 6\"  5' 6\"   Weight - Scale 119 lbs 118 lbs 2 oz 140 lbs 141 lbs 2 oz   BMI (Calculated) 19.3 kg/m2 19.1 kg/m2 0 kg/m2 22.8 kg/m2     Vitals:    07/30/25 0941   BP: 135/72   Pulse: 59   Resp: 16   Temp: 98 °F (36.7 °C)   SpO2: 97%

## 2025-08-24 DIAGNOSIS — F51.01 PRIMARY INSOMNIA: ICD-10-CM

## 2025-08-25 ENCOUNTER — HOSPITAL ENCOUNTER (OUTPATIENT)
Facility: HOSPITAL | Age: 70
Discharge: HOME | End: 2025-08-28
Attending: STUDENT IN AN ORGANIZED HEALTH CARE EDUCATION/TRAINING PROGRAM
Payer: COMMERCIAL

## 2025-08-25 DIAGNOSIS — Z12.31 BREAST CANCER SCREENING BY MAMMOGRAM: ICD-10-CM

## 2025-08-25 PROCEDURE — 77063 BREAST TOMOSYNTHESIS BI: CPT

## 2025-08-25 RX ORDER — TRAZODONE HYDROCHLORIDE 50 MG/1
50 TABLET ORAL NIGHTLY
Qty: 90 TABLET | Refills: 3 | Status: SHIPPED | OUTPATIENT
Start: 2025-08-25